# Patient Record
Sex: FEMALE | Race: ASIAN | NOT HISPANIC OR LATINO
[De-identification: names, ages, dates, MRNs, and addresses within clinical notes are randomized per-mention and may not be internally consistent; named-entity substitution may affect disease eponyms.]

---

## 2017-01-24 ENCOUNTER — APPOINTMENT (OUTPATIENT)
Dept: THORACIC SURGERY | Facility: CLINIC | Age: 77
End: 2017-01-24

## 2017-02-07 ENCOUNTER — APPOINTMENT (OUTPATIENT)
Dept: THORACIC SURGERY | Facility: CLINIC | Age: 77
End: 2017-02-07

## 2017-02-07 VITALS
TEMPERATURE: 98 F | HEART RATE: 71 BPM | BODY MASS INDEX: 23.91 KG/M2 | DIASTOLIC BLOOD PRESSURE: 74 MMHG | OXYGEN SATURATION: 97 % | WEIGHT: 135 LBS | RESPIRATION RATE: 18 BRPM | SYSTOLIC BLOOD PRESSURE: 135 MMHG

## 2017-02-07 RX ORDER — METOPROLOL SUCCINATE 25 MG/1
25 TABLET, EXTENDED RELEASE ORAL
Refills: 0 | Status: ACTIVE | COMMUNITY

## 2017-02-23 ENCOUNTER — APPOINTMENT (OUTPATIENT)
Dept: SURGERY | Facility: CLINIC | Age: 77
End: 2017-02-23

## 2017-02-23 VITALS
BODY MASS INDEX: 23.39 KG/M2 | OXYGEN SATURATION: 98 % | HEIGHT: 63 IN | SYSTOLIC BLOOD PRESSURE: 126 MMHG | DIASTOLIC BLOOD PRESSURE: 76 MMHG | HEART RATE: 66 BPM | TEMPERATURE: 97.8 F | WEIGHT: 132 LBS | RESPIRATION RATE: 16 BRPM

## 2017-02-23 RX ORDER — DEXLANSOPRAZOLE 60 MG/1
60 CAPSULE, DELAYED RELEASE ORAL
Qty: 30 | Refills: 0 | Status: DISCONTINUED | COMMUNITY
Start: 2017-02-07

## 2017-02-23 RX ORDER — ZOLPIDEM TARTRATE 10 MG/1
10 TABLET ORAL
Qty: 20 | Refills: 0 | Status: DISCONTINUED | COMMUNITY
Start: 2017-02-20

## 2017-02-23 RX ORDER — CIPROFLOXACIN HYDROCHLORIDE 500 MG/1
500 TABLET, FILM COATED ORAL
Qty: 14 | Refills: 0 | Status: DISCONTINUED | COMMUNITY
Start: 2017-02-21

## 2017-02-24 ENCOUNTER — TRANSCRIPTION ENCOUNTER (OUTPATIENT)
Age: 77
End: 2017-02-24

## 2017-03-02 ENCOUNTER — RESULT REVIEW (OUTPATIENT)
Age: 77
End: 2017-03-02

## 2017-03-02 ENCOUNTER — EMERGENCY (EMERGENCY)
Facility: HOSPITAL | Age: 77
LOS: 1 days | Discharge: HOME | End: 2017-03-02
Attending: EMERGENCY MEDICINE | Admitting: EMERGENCY MEDICINE
Payer: MEDICARE

## 2017-03-02 VITALS
RESPIRATION RATE: 16 BRPM | SYSTOLIC BLOOD PRESSURE: 135 MMHG | DIASTOLIC BLOOD PRESSURE: 65 MMHG | OXYGEN SATURATION: 99 % | HEART RATE: 80 BPM

## 2017-03-02 VITALS
RESPIRATION RATE: 18 BRPM | OXYGEN SATURATION: 98 % | TEMPERATURE: 98 F | HEART RATE: 85 BPM | SYSTOLIC BLOOD PRESSURE: 144 MMHG | DIASTOLIC BLOOD PRESSURE: 60 MMHG

## 2017-03-02 DIAGNOSIS — Z90.2 ACQUIRED ABSENCE OF LUNG [PART OF]: Chronic | ICD-10-CM

## 2017-03-02 DIAGNOSIS — Z98.89 OTHER SPECIFIED POSTPROCEDURAL STATES: Chronic | ICD-10-CM

## 2017-03-02 LAB
ALBUMIN SERPL ELPH-MCNC: 4 G/DL — SIGNIFICANT CHANGE UP (ref 3.3–5)
ALP SERPL-CCNC: 78 U/L — SIGNIFICANT CHANGE UP (ref 40–120)
ALT FLD-CCNC: 25 U/L — SIGNIFICANT CHANGE UP (ref 4–33)
APPEARANCE UR: CLEAR — SIGNIFICANT CHANGE UP
AST SERPL-CCNC: 32 U/L — SIGNIFICANT CHANGE UP (ref 4–32)
BASE EXCESS BLDV CALC-SCNC: -0.9 MMOL/L — SIGNIFICANT CHANGE UP
BASOPHILS # BLD AUTO: 0.01 K/UL — SIGNIFICANT CHANGE UP (ref 0–0.2)
BASOPHILS NFR BLD AUTO: 0.2 % — SIGNIFICANT CHANGE UP (ref 0–2)
BILIRUB SERPL-MCNC: 1.2 MG/DL — SIGNIFICANT CHANGE UP (ref 0.2–1.2)
BILIRUB UR-MCNC: NEGATIVE — SIGNIFICANT CHANGE UP
BLOOD GAS VENOUS - CREATININE: SIGNIFICANT CHANGE UP MG/DL (ref 0.5–1.3)
BLOOD UR QL VISUAL: NEGATIVE — SIGNIFICANT CHANGE UP
BUN SERPL-MCNC: 13 MG/DL — SIGNIFICANT CHANGE UP (ref 7–23)
CALCIUM SERPL-MCNC: 9.4 MG/DL — SIGNIFICANT CHANGE UP (ref 8.4–10.5)
CHLORIDE BLDV-SCNC: 115 MMOL/L — HIGH (ref 96–108)
CHLORIDE SERPL-SCNC: 103 MMOL/L — SIGNIFICANT CHANGE UP (ref 98–107)
CO2 SERPL-SCNC: 23 MMOL/L — SIGNIFICANT CHANGE UP (ref 22–31)
COLOR SPEC: SIGNIFICANT CHANGE UP
CREAT SERPL-MCNC: 0.62 MG/DL — SIGNIFICANT CHANGE UP (ref 0.5–1.3)
EOSINOPHIL # BLD AUTO: 0.02 K/UL — SIGNIFICANT CHANGE UP (ref 0–0.5)
EOSINOPHIL NFR BLD AUTO: 0.4 % — SIGNIFICANT CHANGE UP (ref 0–6)
GAS PNL BLDV: 141 MMOL/L — SIGNIFICANT CHANGE UP (ref 136–146)
GLUCOSE BLDV-MCNC: 90 — SIGNIFICANT CHANGE UP (ref 70–99)
GLUCOSE SERPL-MCNC: 116 MG/DL — HIGH (ref 70–99)
GLUCOSE UR-MCNC: NEGATIVE — SIGNIFICANT CHANGE UP
HCO3 BLDV-SCNC: 24 MMOL/L — SIGNIFICANT CHANGE UP (ref 20–27)
HCT VFR BLD CALC: 39.6 % — SIGNIFICANT CHANGE UP (ref 34.5–45)
HCT VFR BLDV CALC: 35 % — SIGNIFICANT CHANGE UP (ref 34.5–45)
HGB BLD-MCNC: 12.7 G/DL — SIGNIFICANT CHANGE UP (ref 11.5–15.5)
HGB BLDV-MCNC: 11.4 G/DL — LOW (ref 11.5–15.5)
IMM GRANULOCYTES NFR BLD AUTO: 0.2 % — SIGNIFICANT CHANGE UP (ref 0–1.5)
KETONES UR-MCNC: NEGATIVE — SIGNIFICANT CHANGE UP
LACTATE BLDV-MCNC: 1 MMOL/L — SIGNIFICANT CHANGE UP (ref 0.5–2)
LEUKOCYTE ESTERASE UR-ACNC: NEGATIVE — SIGNIFICANT CHANGE UP
LYMPHOCYTES # BLD AUTO: 1.61 K/UL — SIGNIFICANT CHANGE UP (ref 1–3.3)
LYMPHOCYTES # BLD AUTO: 30.4 % — SIGNIFICANT CHANGE UP (ref 13–44)
MCHC RBC-ENTMCNC: 32.1 % — SIGNIFICANT CHANGE UP (ref 32–36)
MCHC RBC-ENTMCNC: 32.2 PG — SIGNIFICANT CHANGE UP (ref 27–34)
MCV RBC AUTO: 100.5 FL — HIGH (ref 80–100)
MONOCYTES # BLD AUTO: 0.46 K/UL — SIGNIFICANT CHANGE UP (ref 0–0.9)
MONOCYTES NFR BLD AUTO: 8.7 % — SIGNIFICANT CHANGE UP (ref 2–14)
NEUTROPHILS # BLD AUTO: 3.19 K/UL — SIGNIFICANT CHANGE UP (ref 1.8–7.4)
NEUTROPHILS NFR BLD AUTO: 60.1 % — SIGNIFICANT CHANGE UP (ref 43–77)
NITRITE UR-MCNC: NEGATIVE — SIGNIFICANT CHANGE UP
PCO2 BLDV: 31 MMHG — LOW (ref 41–51)
PH BLDV: 7.47 PH — HIGH (ref 7.32–7.43)
PH UR: 6.5 — SIGNIFICANT CHANGE UP (ref 4.6–8)
PLATELET # BLD AUTO: 171 K/UL — SIGNIFICANT CHANGE UP (ref 150–400)
PMV BLD: 10.9 FL — SIGNIFICANT CHANGE UP (ref 7–13)
PO2 BLDV: 66 MMHG — HIGH (ref 35–40)
POTASSIUM BLDV-SCNC: 4 MMOL/L — SIGNIFICANT CHANGE UP (ref 3.4–4.5)
POTASSIUM SERPL-MCNC: 4.4 MMOL/L — SIGNIFICANT CHANGE UP (ref 3.5–5.3)
POTASSIUM SERPL-SCNC: 4.4 MMOL/L — SIGNIFICANT CHANGE UP (ref 3.5–5.3)
PROT SERPL-MCNC: 7.7 G/DL — SIGNIFICANT CHANGE UP (ref 6–8.3)
PROT UR-MCNC: NEGATIVE — SIGNIFICANT CHANGE UP
RBC # BLD: 3.94 M/UL — SIGNIFICANT CHANGE UP (ref 3.8–5.2)
RBC # FLD: 12.3 % — SIGNIFICANT CHANGE UP (ref 10.3–14.5)
RBC CASTS # UR COMP ASSIST: SIGNIFICANT CHANGE UP (ref 0–?)
SAO2 % BLDV: 94.5 % — HIGH (ref 60–85)
SODIUM SERPL-SCNC: 141 MMOL/L — SIGNIFICANT CHANGE UP (ref 135–145)
SP GR SPEC: 1 — SIGNIFICANT CHANGE UP (ref 1–1.03)
SQUAMOUS # UR AUTO: SIGNIFICANT CHANGE UP
UROBILINOGEN FLD QL: NORMAL E.U. — SIGNIFICANT CHANGE UP (ref 0.1–0.2)
WBC # BLD: 5.3 K/UL — SIGNIFICANT CHANGE UP (ref 3.8–10.5)
WBC # FLD AUTO: 5.3 K/UL — SIGNIFICANT CHANGE UP (ref 3.8–10.5)
WBC UR QL: SIGNIFICANT CHANGE UP (ref 0–?)

## 2017-03-02 PROCEDURE — 74177 CT ABD & PELVIS W/CONTRAST: CPT | Mod: 26

## 2017-03-02 PROCEDURE — 99285 EMERGENCY DEPT VISIT HI MDM: CPT | Mod: GC

## 2017-03-02 RX ORDER — OXYBUTYNIN CHLORIDE 5 MG
1 TABLET ORAL
Qty: 30 | Refills: 0 | OUTPATIENT
Start: 2017-03-02 | End: 2017-03-12

## 2017-03-02 NOTE — ED ADULT NURSE NOTE - OBJECTIVE STATEMENT
75 y/o female presents to ED with c/o lower abd pain.  With daughter as , pt states that she has been having lower abd pain for the past month and then had hematuria 1 week ago and was seen by urologist and treated with course of antibiotics that was completed today.  Pt also has history of non small cell lung cancer, completed chemo in Dec 2016, pt has PET scan a few weeks ago and a peritoneal mass was found, pt scheduled for biopsy tomorrow.  Pt states that last night she began having more severe lower abd pain associated with urinary frequency and urgency, denies fever chills, no n/v/d, denies CP, SOB.

## 2017-03-02 NOTE — ED PROVIDER NOTE - ATTENDING CONTRIBUTION TO CARE
Nissa 76F p/w pelvic pain and urinary frequency.  Recently tx outpt for similar sx for UTI - (PA called PMD - uCx negative).  Scheduled tomorrow for cystoscopy.  Worsening pain, unable to sleep last night.  Does not want pain medication here.  Reports dysuria persists after tx for UTI however hematuria improved.  VS:  unremarkable    GEN - mild distress pelvic pain; A+O x3   HEAD - NC/AT     ENT - PEERL, EOMI, mucous membranes dry , no discharge      NECK: Neck supple, non-tender without lymphadenopathy, no masses, no JVD  PULM - CTA b/l,  symmetric breath sounds  COR -  normal heart sounds    ABD - , ND, suprapubic ttp, soft, no guarding, no rebound, no masses    BACK - no CVA tenderness, nontender spine     EXTREMS - no edema, no deformity, warm and well perfused    SKIN - no rash or bruising      NEUROLOGIC - alert, CN 2-12 intact, sensation nl, motor 5/5 RUE/LUE/RLE/LLE.      IMP:  76F p/w pelvic pain and dysuria worsening, keeping her up at night.  Recent dx via PET/CT of peritoneal carcinomatosis, following with H/o at [facility] and transferring all care to [facility].  NB pt refused pelvic exam even by a female.  Given neg UA would check CT for anatomic problem santiago given carcinomatosis.  All found unremarkable/unchanged, rx ditropan, f/u Urology.

## 2017-03-02 NOTE — ED PROVIDER NOTE - MEDICAL DECISION MAKING DETAILS
77 yo F PMH Breast Ca, Lung Ca, and newly dx pelvic mass c/o pelvic pain and urinary frequency  R/O UTI  CBC/CMP, U/A, Pain Control, CT Pelvis

## 2017-03-02 NOTE — ED ADULT TRIAGE NOTE - CHIEF COMPLAINT QUOTE
p/t c/o of lower abd pain and dysuria for one week p/t recently completed chemo treatment for lung ca denies any nausea denies vomiting

## 2017-03-02 NOTE — ED PROVIDER NOTE - PMH
Breast cancer  2007 Left Mastectomy  Headache    Hepatitis B    Hyperlipidemia    Lung mass    Pelvic mass in female  diagnosed via PET/CT in 2/2017

## 2017-03-02 NOTE — ED PROVIDER NOTE - OBJECTIVE STATEMENT
75 yo F PMH Breast Ca (2007), Lung Ca (2016), HLD, and pelvic mass presents with pelvic pain and urinary frequency. Pt was recently treated outpatient for UTI by Dr. Finch with Cipro (regimen completed today), cultures were negative; pt hematuria has resolved but urinary frequency and dysuria still present. Pt has pelvic mass (questionable mets from lung) diagnosed 2 weeks ago via PET/CT scan by oncologist. Pt has bx scheduled for tomorrow with Dr. Malhotra ([facility]). Pt also has follow up with Dr. Finch for cystoscopy. Pt reports having diffuse suprapubic pain, ongoing for 2 months, increased in severity from baseline last night; refusing pain medication in ED and has taken no pain meds at home. Pt denies any chest pain, SOB, syncope, dizziness, headache, N/V/D, abdominal pain, hematuria, flank pain.

## 2017-03-03 ENCOUNTER — APPOINTMENT (OUTPATIENT)
Dept: CT IMAGING | Facility: HOSPITAL | Age: 77
End: 2017-03-03

## 2017-03-03 ENCOUNTER — OUTPATIENT (OUTPATIENT)
Dept: OUTPATIENT SERVICES | Facility: HOSPITAL | Age: 77
LOS: 1 days | End: 2017-03-03
Payer: MEDICARE

## 2017-03-03 DIAGNOSIS — Z98.89 OTHER SPECIFIED POSTPROCEDURAL STATES: Chronic | ICD-10-CM

## 2017-03-03 DIAGNOSIS — C78.6 SECONDARY MALIGNANT NEOPLASM OF RETROPERITONEUM AND PERITONEUM: ICD-10-CM

## 2017-03-03 DIAGNOSIS — Z90.2 ACQUIRED ABSENCE OF LUNG [PART OF]: Chronic | ICD-10-CM

## 2017-03-03 LAB — SPECIMEN SOURCE: SIGNIFICANT CHANGE UP

## 2017-03-03 PROCEDURE — 88305 TISSUE EXAM BY PATHOLOGIST: CPT

## 2017-03-03 PROCEDURE — 88341 IMHCHEM/IMCYTCHM EA ADD ANTB: CPT

## 2017-03-03 PROCEDURE — 77012 CT SCAN FOR NEEDLE BIOPSY: CPT | Mod: 26

## 2017-03-03 PROCEDURE — 88172 CYTP DX EVAL FNA 1ST EA SITE: CPT

## 2017-03-03 PROCEDURE — 49180 BIOPSY ABDOMINAL MASS: CPT

## 2017-03-03 PROCEDURE — 88341 IMHCHEM/IMCYTCHM EA ADD ANTB: CPT | Mod: 26,59

## 2017-03-03 PROCEDURE — 88173 CYTOPATH EVAL FNA REPORT: CPT | Mod: 26

## 2017-03-03 PROCEDURE — 88173 CYTOPATH EVAL FNA REPORT: CPT

## 2017-03-03 PROCEDURE — 88360 TUMOR IMMUNOHISTOCHEM/MANUAL: CPT | Mod: 26

## 2017-03-03 PROCEDURE — 88305 TISSUE EXAM BY PATHOLOGIST: CPT | Mod: 26

## 2017-03-03 PROCEDURE — 88342 IMHCHEM/IMCYTCHM 1ST ANTB: CPT | Mod: 26,59

## 2017-03-03 PROCEDURE — 88342 IMHCHEM/IMCYTCHM 1ST ANTB: CPT

## 2017-03-03 PROCEDURE — 77012 CT SCAN FOR NEEDLE BIOPSY: CPT

## 2017-03-03 PROCEDURE — 88360 TUMOR IMMUNOHISTOCHEM/MANUAL: CPT

## 2017-03-04 ENCOUNTER — OUTPATIENT (OUTPATIENT)
Dept: OUTPATIENT SERVICES | Facility: HOSPITAL | Age: 77
LOS: 1 days | Discharge: HOME | End: 2017-03-04

## 2017-03-04 DIAGNOSIS — Z98.89 OTHER SPECIFIED POSTPROCEDURAL STATES: Chronic | ICD-10-CM

## 2017-03-04 DIAGNOSIS — C78.6 SECONDARY MALIGNANT NEOPLASM OF RETROPERITONEUM AND PERITONEUM: ICD-10-CM

## 2017-03-04 DIAGNOSIS — Z90.2 ACQUIRED ABSENCE OF LUNG [PART OF]: Chronic | ICD-10-CM

## 2017-03-04 LAB — BACTERIA UR CULT: SIGNIFICANT CHANGE UP

## 2017-03-06 ENCOUNTER — RESULT REVIEW (OUTPATIENT)
Age: 77
End: 2017-03-06

## 2017-03-06 LAB — NON-GYNECOLOGICAL CYTOLOGY STUDY: SIGNIFICANT CHANGE UP

## 2017-03-07 ENCOUNTER — APPOINTMENT (OUTPATIENT)
Dept: HEMATOLOGY ONCOLOGY | Facility: CLINIC | Age: 77
End: 2017-03-07

## 2017-03-07 VITALS
RESPIRATION RATE: 16 BRPM | WEIGHT: 130.07 LBS | SYSTOLIC BLOOD PRESSURE: 140 MMHG | TEMPERATURE: 97.3 F | DIASTOLIC BLOOD PRESSURE: 70 MMHG | HEIGHT: 63.98 IN | BODY MASS INDEX: 22.21 KG/M2 | HEART RATE: 52 BPM | OXYGEN SATURATION: 99 %

## 2017-03-07 DIAGNOSIS — D15.0 BENIGN NEOPLASM OF THYMUS: ICD-10-CM

## 2017-03-07 LAB
BASOPHILS # BLD AUTO: 0 K/UL — SIGNIFICANT CHANGE UP (ref 0–0.2)
BASOPHILS NFR BLD AUTO: 0.4 % — SIGNIFICANT CHANGE UP (ref 0–2)
EOSINOPHIL # BLD AUTO: 0.1 K/UL — SIGNIFICANT CHANGE UP (ref 0–0.5)
EOSINOPHIL NFR BLD AUTO: 1.1 % — SIGNIFICANT CHANGE UP (ref 0–6)
HCT VFR BLD CALC: 37.7 % — SIGNIFICANT CHANGE UP (ref 34.5–45)
HGB BLD-MCNC: 12.8 G/DL — SIGNIFICANT CHANGE UP (ref 11.5–15.5)
LYMPHOCYTES # BLD AUTO: 2.1 K/UL — SIGNIFICANT CHANGE UP (ref 1–3.3)
LYMPHOCYTES # BLD AUTO: 37.3 % — SIGNIFICANT CHANGE UP (ref 13–44)
MCHC RBC-ENTMCNC: 33.5 PG — SIGNIFICANT CHANGE UP (ref 27–34)
MCHC RBC-ENTMCNC: 33.8 G/DL — SIGNIFICANT CHANGE UP (ref 32–36)
MCV RBC AUTO: 99.2 FL — SIGNIFICANT CHANGE UP (ref 80–100)
MONOCYTES # BLD AUTO: 0.5 K/UL — SIGNIFICANT CHANGE UP (ref 0–0.9)
MONOCYTES NFR BLD AUTO: 9.4 % — SIGNIFICANT CHANGE UP (ref 2–14)
NEUTROPHILS # BLD AUTO: 2.9 K/UL — SIGNIFICANT CHANGE UP (ref 1.8–7.4)
NEUTROPHILS NFR BLD AUTO: 51.8 % — SIGNIFICANT CHANGE UP (ref 43–77)
PLATELET # BLD AUTO: 148 K/UL — LOW (ref 150–400)
RBC # BLD: 3.8 M/UL — SIGNIFICANT CHANGE UP (ref 3.8–5.2)
RBC # FLD: 11.4 % — SIGNIFICANT CHANGE UP (ref 10.3–14.5)
WBC # BLD: 5.6 K/UL — SIGNIFICANT CHANGE UP (ref 3.8–10.5)
WBC # FLD AUTO: 5.6 K/UL — SIGNIFICANT CHANGE UP (ref 3.8–10.5)

## 2017-03-07 RX ORDER — FILGRASTIM-SNDZ 480 UG/.8ML
480 INJECTION, SOLUTION INTRAVENOUS; SUBCUTANEOUS
Refills: 0 | Status: DISCONTINUED | COMMUNITY
End: 2017-03-07

## 2017-03-07 RX ORDER — CARBOPLATIN 10 MG/ML
150 INJECTION, SOLUTION INTRAVENOUS
Refills: 0 | Status: DISCONTINUED | COMMUNITY
End: 2017-03-07

## 2017-03-07 RX ORDER — PALONOSETRON HYDROCHLORIDE 0.05 MG/ML
0.25 INJECTION INTRAVENOUS
Refills: 0 | Status: DISCONTINUED | COMMUNITY
End: 2017-03-07

## 2017-03-07 RX ORDER — PEMETREXED DISODIUM 500 MG/20ML
500 INJECTION, POWDER, LYOPHILIZED, FOR SOLUTION INTRAVENOUS
Refills: 0 | Status: DISCONTINUED | COMMUNITY
End: 2017-03-07

## 2017-03-07 RX ORDER — DEXAMETHASONE SODIUM PHOSPHATE 10 MG/ML
10 INJECTION INTRAMUSCULAR; INTRAVENOUS
Refills: 0 | Status: DISCONTINUED | COMMUNITY
End: 2017-03-07

## 2017-03-07 RX ORDER — OXYCODONE 5 MG/1
5 TABLET ORAL
Qty: 120 | Refills: 0 | Status: ACTIVE | COMMUNITY
Start: 2017-03-07 | End: 1900-01-01

## 2017-03-08 DIAGNOSIS — R19.03 RIGHT LOWER QUADRANT ABDOMINAL SWELLING, MASS AND LUMP: ICD-10-CM

## 2017-03-13 ENCOUNTER — RECORD ABSTRACTING (OUTPATIENT)
Age: 77
End: 2017-03-13

## 2017-03-13 DIAGNOSIS — Z85.3 PERSONAL HISTORY OF MALIGNANT NEOPLASM OF BREAST: ICD-10-CM

## 2017-03-13 DIAGNOSIS — Z87.898 PERSONAL HISTORY OF OTHER SPECIFIED CONDITIONS: ICD-10-CM

## 2017-03-13 DIAGNOSIS — J98.59 OTHER DISEASES OF MEDIASTINUM, NOT ELSEWHERE CLASSIFIED: ICD-10-CM

## 2017-03-13 DIAGNOSIS — C34.32 MALIGNANT NEOPLASM OF LOWER LOBE, LEFT BRONCHUS OR LUNG: ICD-10-CM

## 2017-03-13 DIAGNOSIS — Z87.39 PERSONAL HISTORY OF OTHER DISEASES OF THE MUSCULOSKELETAL SYSTEM AND CONNECTIVE TISSUE: ICD-10-CM

## 2017-03-13 DIAGNOSIS — Z86.39 PERSONAL HISTORY OF OTHER ENDOCRINE, NUTRITIONAL AND METABOLIC DISEASE: ICD-10-CM

## 2017-03-15 ENCOUNTER — APPOINTMENT (OUTPATIENT)
Dept: GYNECOLOGIC ONCOLOGY | Facility: CLINIC | Age: 77
End: 2017-03-15

## 2017-03-15 VITALS
BODY MASS INDEX: 22.53 KG/M2 | DIASTOLIC BLOOD PRESSURE: 64 MMHG | HEIGHT: 64 IN | HEART RATE: 58 BPM | WEIGHT: 132 LBS | SYSTOLIC BLOOD PRESSURE: 124 MMHG

## 2017-03-15 DIAGNOSIS — C80.1 SECONDARY MALIGNANT NEOPLASM OF RETROPERITONEUM AND PERITONEUM: ICD-10-CM

## 2017-03-15 DIAGNOSIS — C78.6 SECONDARY MALIGNANT NEOPLASM OF RETROPERITONEUM AND PERITONEUM: ICD-10-CM

## 2017-03-15 DIAGNOSIS — R97.1 ELEVATED CANCER ANTIGEN 125 [CA 125]: ICD-10-CM

## 2017-03-15 DIAGNOSIS — C34.92 MALIGNANT NEOPLASM OF UNSPECIFIED PART OF LEFT BRONCHUS OR LUNG: ICD-10-CM

## 2017-03-15 RX ORDER — LEVOFLOXACIN 500 MG/1
500 TABLET, FILM COATED ORAL
Refills: 0 | Status: COMPLETED | COMMUNITY
End: 2017-03-15

## 2017-03-15 RX ORDER — POTASSIUM CHLORIDE 1.5 G/1.58G
20 POWDER, FOR SOLUTION ORAL
Refills: 0 | Status: COMPLETED | COMMUNITY
End: 2017-03-15

## 2017-03-15 RX ORDER — THIAMINE HCL 100 MG
500 TABLET ORAL
Refills: 0 | Status: COMPLETED | COMMUNITY
End: 2017-03-15

## 2017-03-16 ENCOUNTER — APPOINTMENT (OUTPATIENT)
Dept: SURGERY | Facility: CLINIC | Age: 77
End: 2017-03-16

## 2017-03-16 LAB
ALBUMIN SERPL ELPH-MCNC: 4.2 G/DL
ALP BLD-CCNC: 80 U/L
ALT SERPL-CCNC: 21 U/L
ANION GAP SERPL CALC-SCNC: 16 MMOL/L
AST SERPL-CCNC: 24 U/L
BILIRUB SERPL-MCNC: 0.4 MG/DL
BUN SERPL-MCNC: 11 MG/DL
CALCIUM SERPL-MCNC: 9.5 MG/DL
CANCER AG125 SERPL-ACNC: 392 U/ML
CANCER AG19-9 SERPL-ACNC: 22.4 U/ML
CEA SERPL-MCNC: 2 NG/ML
CHLORIDE SERPL-SCNC: 101 MMOL/L
CO2 SERPL-SCNC: 25 MMOL/L
CREAT SERPL-MCNC: 0.6 MG/DL
GLUCOSE SERPL-MCNC: 102 MG/DL
LDH SERPL-CCNC: 205 U/L
POTASSIUM SERPL-SCNC: 4.3 MMOL/L
PROT SERPL-MCNC: 7.5 G/DL
SODIUM SERPL-SCNC: 142 MMOL/L

## 2017-03-17 ENCOUNTER — MOBILE ON CALL (OUTPATIENT)
Age: 77
End: 2017-03-17

## 2017-03-23 ENCOUNTER — OTHER (OUTPATIENT)
Age: 77
End: 2017-03-23

## 2017-03-31 ENCOUNTER — APPOINTMENT (OUTPATIENT)
Dept: GYNECOLOGIC ONCOLOGY | Facility: HOSPITAL | Age: 77
End: 2017-03-31

## 2017-04-25 ENCOUNTER — APPOINTMENT (OUTPATIENT)
Dept: THORACIC SURGERY | Facility: CLINIC | Age: 77
End: 2017-04-25

## 2017-08-08 ENCOUNTER — APPOINTMENT (OUTPATIENT)
Dept: THORACIC SURGERY | Facility: CLINIC | Age: 77
End: 2017-08-08

## 2018-01-22 ENCOUNTER — INPATIENT (INPATIENT)
Facility: HOSPITAL | Age: 78
LOS: 2 days | Discharge: HOME | End: 2018-01-25
Attending: HOSPITALIST | Admitting: HOSPITALIST
Payer: MEDICARE

## 2018-01-22 VITALS
SYSTOLIC BLOOD PRESSURE: 122 MMHG | HEART RATE: 72 BPM | TEMPERATURE: 98 F | OXYGEN SATURATION: 100 % | RESPIRATION RATE: 18 BRPM | DIASTOLIC BLOOD PRESSURE: 65 MMHG

## 2018-01-22 DIAGNOSIS — Z98.89 OTHER SPECIFIED POSTPROCEDURAL STATES: Chronic | ICD-10-CM

## 2018-01-22 DIAGNOSIS — Z90.2 ACQUIRED ABSENCE OF LUNG [PART OF]: Chronic | ICD-10-CM

## 2018-01-22 LAB
ALBUMIN SERPL ELPH-MCNC: 3.4 G/DL — SIGNIFICANT CHANGE UP (ref 3.3–5)
ALP SERPL-CCNC: 67 U/L — SIGNIFICANT CHANGE UP (ref 40–120)
ALT FLD-CCNC: 10 U/L — SIGNIFICANT CHANGE UP (ref 4–33)
APTT BLD: 25.7 SEC — LOW (ref 27.5–37.4)
AST SERPL-CCNC: 15 U/L — SIGNIFICANT CHANGE UP (ref 4–32)
B PERT DNA SPEC QL NAA+PROBE: SIGNIFICANT CHANGE UP
BASE EXCESS BLDV CALC-SCNC: 5.9 MMOL/L — SIGNIFICANT CHANGE UP
BASOPHILS # BLD AUTO: 0 K/UL — SIGNIFICANT CHANGE UP (ref 0–0.2)
BASOPHILS NFR BLD AUTO: 0 % — SIGNIFICANT CHANGE UP (ref 0–2)
BASOPHILS NFR SPEC: 2.3 % — HIGH (ref 0–2)
BILIRUB SERPL-MCNC: 0.5 MG/DL — SIGNIFICANT CHANGE UP (ref 0.2–1.2)
BLOOD GAS VENOUS - CREATININE: 0.56 MG/DL — SIGNIFICANT CHANGE UP (ref 0.5–1.3)
BUN SERPL-MCNC: 9 MG/DL — SIGNIFICANT CHANGE UP (ref 7–23)
C PNEUM DNA SPEC QL NAA+PROBE: NOT DETECTED — SIGNIFICANT CHANGE UP
CALCIUM SERPL-MCNC: 9 MG/DL — SIGNIFICANT CHANGE UP (ref 8.4–10.5)
CHLORIDE BLDV-SCNC: 103 MMOL/L — SIGNIFICANT CHANGE UP (ref 96–108)
CHLORIDE SERPL-SCNC: 96 MMOL/L — LOW (ref 98–107)
CK SERPL-CCNC: 70 U/L — SIGNIFICANT CHANGE UP (ref 25–170)
CO2 SERPL-SCNC: 27 MMOL/L — SIGNIFICANT CHANGE UP (ref 22–31)
CREAT SERPL-MCNC: 0.61 MG/DL — SIGNIFICANT CHANGE UP (ref 0.5–1.3)
EOSINOPHIL # BLD AUTO: 0.03 K/UL — SIGNIFICANT CHANGE UP (ref 0–0.5)
EOSINOPHIL NFR BLD AUTO: 1.2 % — SIGNIFICANT CHANGE UP (ref 0–6)
EOSINOPHIL NFR FLD: 2.3 % — SIGNIFICANT CHANGE UP (ref 0–6)
FLUAV H1 2009 PAND RNA SPEC QL NAA+PROBE: NOT DETECTED — SIGNIFICANT CHANGE UP
FLUAV H1 RNA SPEC QL NAA+PROBE: NOT DETECTED — SIGNIFICANT CHANGE UP
FLUAV H3 RNA SPEC QL NAA+PROBE: NOT DETECTED — SIGNIFICANT CHANGE UP
FLUAV SUBTYP SPEC NAA+PROBE: SIGNIFICANT CHANGE UP
FLUBV RNA SPEC QL NAA+PROBE: NOT DETECTED — SIGNIFICANT CHANGE UP
GAS PNL BLDV: 131 MMOL/L — LOW (ref 136–146)
GIANT PLATELETS BLD QL SMEAR: PRESENT — SIGNIFICANT CHANGE UP
GLUCOSE BLDV-MCNC: 105 — HIGH (ref 70–99)
GLUCOSE SERPL-MCNC: 99 MG/DL — SIGNIFICANT CHANGE UP (ref 70–99)
HADV DNA SPEC QL NAA+PROBE: NOT DETECTED — SIGNIFICANT CHANGE UP
HCO3 BLDV-SCNC: 29 MMOL/L — HIGH (ref 20–27)
HCOV 229E RNA SPEC QL NAA+PROBE: NOT DETECTED — SIGNIFICANT CHANGE UP
HCOV HKU1 RNA SPEC QL NAA+PROBE: NOT DETECTED — SIGNIFICANT CHANGE UP
HCOV NL63 RNA SPEC QL NAA+PROBE: NOT DETECTED — SIGNIFICANT CHANGE UP
HCOV OC43 RNA SPEC QL NAA+PROBE: NOT DETECTED — SIGNIFICANT CHANGE UP
HCT VFR BLD CALC: 32.9 % — LOW (ref 34.5–45)
HCT VFR BLDV CALC: 33 % — LOW (ref 34.5–45)
HGB BLD-MCNC: 10.6 G/DL — LOW (ref 11.5–15.5)
HGB BLDV-MCNC: 10.7 G/DL — LOW (ref 11.5–15.5)
HMPV RNA SPEC QL NAA+PROBE: NOT DETECTED — SIGNIFICANT CHANGE UP
HPIV1 RNA SPEC QL NAA+PROBE: NOT DETECTED — SIGNIFICANT CHANGE UP
HPIV2 RNA SPEC QL NAA+PROBE: NOT DETECTED — SIGNIFICANT CHANGE UP
HPIV3 RNA SPEC QL NAA+PROBE: NOT DETECTED — SIGNIFICANT CHANGE UP
HPIV4 RNA SPEC QL NAA+PROBE: NOT DETECTED — SIGNIFICANT CHANGE UP
IMM GRANULOCYTES # BLD AUTO: 0.01 # — SIGNIFICANT CHANGE UP
IMM GRANULOCYTES NFR BLD AUTO: 0.4 % — SIGNIFICANT CHANGE UP (ref 0–1.5)
INR BLD: 1.07 — SIGNIFICANT CHANGE UP (ref 0.88–1.17)
LACTATE BLDV-MCNC: 0.9 MMOL/L — SIGNIFICANT CHANGE UP (ref 0.5–2)
LYMPHOCYTES # BLD AUTO: 0.91 K/UL — LOW (ref 1–3.3)
LYMPHOCYTES # BLD AUTO: 37.3 % — SIGNIFICANT CHANGE UP (ref 13–44)
LYMPHOCYTES NFR SPEC AUTO: 27.9 % — SIGNIFICANT CHANGE UP (ref 13–44)
M PNEUMO DNA SPEC QL NAA+PROBE: NOT DETECTED — SIGNIFICANT CHANGE UP
MANUAL SMEAR VERIFICATION: SIGNIFICANT CHANGE UP
MCHC RBC-ENTMCNC: 30.8 PG — SIGNIFICANT CHANGE UP (ref 27–34)
MCHC RBC-ENTMCNC: 32.2 % — SIGNIFICANT CHANGE UP (ref 32–36)
MCV RBC AUTO: 95.6 FL — SIGNIFICANT CHANGE UP (ref 80–100)
MONOCYTES # BLD AUTO: 0.37 K/UL — SIGNIFICANT CHANGE UP (ref 0–0.9)
MONOCYTES NFR BLD AUTO: 15.2 % — HIGH (ref 2–14)
MONOCYTES NFR BLD: 16.3 % — HIGH (ref 2–9)
MORPHOLOGY BLD-IMP: NORMAL — SIGNIFICANT CHANGE UP
NEUTROPHIL AB SER-ACNC: 44.2 % — SIGNIFICANT CHANGE UP (ref 43–77)
NEUTROPHILS # BLD AUTO: 1.12 K/UL — LOW (ref 1.8–7.4)
NEUTROPHILS NFR BLD AUTO: 45.9 % — SIGNIFICANT CHANGE UP (ref 43–77)
NRBC # FLD: 0 — SIGNIFICANT CHANGE UP
PCO2 BLDV: 49 MMHG — SIGNIFICANT CHANGE UP (ref 41–51)
PH BLDV: 7.41 PH — SIGNIFICANT CHANGE UP (ref 7.32–7.43)
PLATELET # BLD AUTO: 181 K/UL — SIGNIFICANT CHANGE UP (ref 150–400)
PLATELET COUNT - ESTIMATE: NORMAL — SIGNIFICANT CHANGE UP
PMV BLD: 10.1 FL — SIGNIFICANT CHANGE UP (ref 7–13)
PO2 BLDV: 31 MMHG — LOW (ref 35–40)
POTASSIUM BLDV-SCNC: 3.7 MMOL/L — SIGNIFICANT CHANGE UP (ref 3.4–4.5)
POTASSIUM SERPL-MCNC: 4.1 MMOL/L — SIGNIFICANT CHANGE UP (ref 3.5–5.3)
POTASSIUM SERPL-SCNC: 4.1 MMOL/L — SIGNIFICANT CHANGE UP (ref 3.5–5.3)
PROT SERPL-MCNC: 6.8 G/DL — SIGNIFICANT CHANGE UP (ref 6–8.3)
PROTHROM AB SERPL-ACNC: 11.9 SEC — SIGNIFICANT CHANGE UP (ref 9.8–13.1)
RBC # BLD: 3.44 M/UL — LOW (ref 3.8–5.2)
RBC # FLD: 14.6 % — HIGH (ref 10.3–14.5)
RSV RNA SPEC QL NAA+PROBE: NOT DETECTED — SIGNIFICANT CHANGE UP
RV+EV RNA SPEC QL NAA+PROBE: NOT DETECTED — SIGNIFICANT CHANGE UP
SAO2 % BLDV: 53.4 % — LOW (ref 60–85)
SODIUM SERPL-SCNC: 133 MMOL/L — LOW (ref 135–145)
TROPONIN T SERPL-MCNC: < 0.06 NG/ML — SIGNIFICANT CHANGE UP (ref 0–0.06)
VARIANT LYMPHS # BLD: 7 % — SIGNIFICANT CHANGE UP
WBC # BLD: 2.44 K/UL — LOW (ref 3.8–10.5)
WBC # FLD AUTO: 2.44 K/UL — LOW (ref 3.8–10.5)

## 2018-01-22 PROCEDURE — 71275 CT ANGIOGRAPHY CHEST: CPT | Mod: 26

## 2018-01-22 RX ORDER — PIPERACILLIN AND TAZOBACTAM 4; .5 G/20ML; G/20ML
3.38 INJECTION, POWDER, LYOPHILIZED, FOR SOLUTION INTRAVENOUS ONCE
Qty: 0 | Refills: 0 | Status: COMPLETED | OUTPATIENT
Start: 2018-01-22 | End: 2018-01-22

## 2018-01-22 RX ORDER — VANCOMYCIN HCL 1 G
1000 VIAL (EA) INTRAVENOUS ONCE
Qty: 0 | Refills: 0 | Status: COMPLETED | OUTPATIENT
Start: 2018-01-22 | End: 2018-01-22

## 2018-01-22 RX ORDER — ACETAMINOPHEN 500 MG
1000 TABLET ORAL ONCE
Qty: 0 | Refills: 0 | Status: COMPLETED | OUTPATIENT
Start: 2018-01-22 | End: 2018-01-22

## 2018-01-22 RX ADMIN — PIPERACILLIN AND TAZOBACTAM 200 GRAM(S): 4; .5 INJECTION, POWDER, LYOPHILIZED, FOR SOLUTION INTRAVENOUS at 19:03

## 2018-01-22 RX ADMIN — Medication 400 MILLIGRAM(S): at 19:03

## 2018-01-22 RX ADMIN — Medication 250 MILLIGRAM(S): at 19:04

## 2018-01-22 NOTE — ED PROVIDER NOTE - MEDICAL DECISION MAKING DETAILS
77F, extensive cancer hx on chemo presenting with fevers, cough, weakness, sore throat. Exam as above. Concern for infectious process given symptoms and immunocompromised state. Will begin antibx, tylenol, hycodan. Labs, Bcx, Ucx, CT chest. Currently stable, no acute distress. Will continue to follow up and re-assess. Case discussed with Attending: Mirtha Waldrop MD, PGY1

## 2018-01-22 NOTE — ED ADULT NURSE NOTE - OBJECTIVE STATEMENT
pt comes in c/o of 1 month of chest pain and cough--through  pt c/o  cough and chest pain x1 month and chest discomfort and some SOB  rectal temp-100.4--#20 angio placed rt wrist--labs done  awaiting md

## 2018-01-22 NOTE — ED PROVIDER NOTE - OBJECTIVE STATEMENT
77F, pmhx lung, ovarian, breast ca on chemo presents with chief complaint of 1 month of cough, chest pain, sore throat, fever. Patient speaks Mandarin only,  #[id number] used. Per patient, has had a month of cough with extensive sputum, as well as intermittent chest pain worse with cough. In addition, has been having recent sore throat and low fevers. Patient denies any abdominal pain or vomiting but endorses some nausea. Patient denies dysuria, headache, dizziness, blurry vision. Patient reports having had multiple surgeries for her cancers including abd surgery at some unknown time for ovrarian cancer as well as some surgical intervention last year for her breat and lung cancer on the left side. Patient had a left sided mastectomy as well as other procedures done. Currently on chemo, has right sided port. PCP Paul Rahman, [phone]

## 2018-01-22 NOTE — ED PROVIDER NOTE - PHYSICAL EXAMINATION
Breast exam performed with chaperone RN Darshana Kaur  Left mastectomy. No masses or lumps felt in right breast., no axillary lymph nodes felt bilaterally.   Cough   Minimal exp wheezes in upper lungs fields  Good air entry in all lung fields  Mid line abd scar noted   No abd tenderness  Febrile 100.4 rectally

## 2018-01-22 NOTE — ED ADULT TRIAGE NOTE - CHIEF COMPLAINT QUOTE
Patient brought to ER from PMD office by EMS for c/o bilateral breast pain. Pt had a lobectomy on left one and now has pain to both which is causing shortness of breath and something feels heavy on her chest. Pt has lung and ovarian cancer and sent for further evaluation.

## 2018-01-23 DIAGNOSIS — C56.9 MALIGNANT NEOPLASM OF UNSPECIFIED OVARY: ICD-10-CM

## 2018-01-23 DIAGNOSIS — C50.919 MALIGNANT NEOPLASM OF UNSPECIFIED SITE OF UNSPECIFIED FEMALE BREAST: ICD-10-CM

## 2018-01-23 DIAGNOSIS — R50.9 FEVER, UNSPECIFIED: ICD-10-CM

## 2018-01-23 DIAGNOSIS — Z29.9 ENCOUNTER FOR PROPHYLACTIC MEASURES, UNSPECIFIED: ICD-10-CM

## 2018-01-23 DIAGNOSIS — J45.901 UNSPECIFIED ASTHMA WITH (ACUTE) EXACERBATION: ICD-10-CM

## 2018-01-23 DIAGNOSIS — Z79.899 OTHER LONG TERM (CURRENT) DRUG THERAPY: ICD-10-CM

## 2018-01-23 DIAGNOSIS — R65.10 SYSTEMIC INFLAMMATORY RESPONSE SYNDROME (SIRS) OF NON-INFECTIOUS ORIGIN WITHOUT ACUTE ORGAN DYSFUNCTION: ICD-10-CM

## 2018-01-23 DIAGNOSIS — C34.90 MALIGNANT NEOPLASM OF UNSPECIFIED PART OF UNSPECIFIED BRONCHUS OR LUNG: ICD-10-CM

## 2018-01-23 DIAGNOSIS — B19.10 UNSPECIFIED VIRAL HEPATITIS B WITHOUT HEPATIC COMA: ICD-10-CM

## 2018-01-23 LAB
APPEARANCE UR: CLEAR — SIGNIFICANT CHANGE UP
BASOPHILS # BLD AUTO: 0 K/UL — SIGNIFICANT CHANGE UP (ref 0–0.2)
BASOPHILS NFR BLD AUTO: 0 % — SIGNIFICANT CHANGE UP (ref 0–2)
BILIRUB UR-MCNC: NEGATIVE — SIGNIFICANT CHANGE UP
BLOOD UR QL VISUAL: NEGATIVE — SIGNIFICANT CHANGE UP
BUN SERPL-MCNC: 6 MG/DL — LOW (ref 7–23)
CALCIUM SERPL-MCNC: 8.1 MG/DL — LOW (ref 8.4–10.5)
CHLORIDE SERPL-SCNC: 103 MMOL/L — SIGNIFICANT CHANGE UP (ref 98–107)
CO2 SERPL-SCNC: 24 MMOL/L — SIGNIFICANT CHANGE UP (ref 22–31)
COLOR SPEC: SIGNIFICANT CHANGE UP
CREAT SERPL-MCNC: 0.5 MG/DL — SIGNIFICANT CHANGE UP (ref 0.5–1.3)
EOSINOPHIL # BLD AUTO: 0.04 K/UL — SIGNIFICANT CHANGE UP (ref 0–0.5)
EOSINOPHIL NFR BLD AUTO: 1.8 % — SIGNIFICANT CHANGE UP (ref 0–6)
GLUCOSE SERPL-MCNC: 109 MG/DL — HIGH (ref 70–99)
GLUCOSE UR-MCNC: NEGATIVE — SIGNIFICANT CHANGE UP
HCT VFR BLD CALC: 32.8 % — LOW (ref 34.5–45)
HGB BLD-MCNC: 10.7 G/DL — LOW (ref 11.5–15.5)
IMM GRANULOCYTES # BLD AUTO: 0.01 # — SIGNIFICANT CHANGE UP
IMM GRANULOCYTES NFR BLD AUTO: 0.5 % — SIGNIFICANT CHANGE UP (ref 0–1.5)
KETONES UR-MCNC: NEGATIVE — SIGNIFICANT CHANGE UP
LEUKOCYTE ESTERASE UR-ACNC: NEGATIVE — SIGNIFICANT CHANGE UP
LYMPHOCYTES # BLD AUTO: 0.75 K/UL — LOW (ref 1–3.3)
LYMPHOCYTES # BLD AUTO: 33.9 % — SIGNIFICANT CHANGE UP (ref 13–44)
MAGNESIUM SERPL-MCNC: 2 MG/DL — SIGNIFICANT CHANGE UP (ref 1.6–2.6)
MANUAL SMEAR VERIFICATION: SIGNIFICANT CHANGE UP
MCHC RBC-ENTMCNC: 32.1 PG — SIGNIFICANT CHANGE UP (ref 27–34)
MCHC RBC-ENTMCNC: 32.6 % — SIGNIFICANT CHANGE UP (ref 32–36)
MCV RBC AUTO: 98.5 FL — SIGNIFICANT CHANGE UP (ref 80–100)
MONOCYTES # BLD AUTO: 0.36 K/UL — SIGNIFICANT CHANGE UP (ref 0–0.9)
MONOCYTES NFR BLD AUTO: 16.3 % — HIGH (ref 2–14)
MUCOUS THREADS # UR AUTO: SIGNIFICANT CHANGE UP
NEUTROPHILS # BLD AUTO: 1.05 K/UL — LOW (ref 1.8–7.4)
NEUTROPHILS NFR BLD AUTO: 47.5 % — SIGNIFICANT CHANGE UP (ref 43–77)
NITRITE UR-MCNC: NEGATIVE — SIGNIFICANT CHANGE UP
NRBC # FLD: 0 — SIGNIFICANT CHANGE UP
NT-PROBNP SERPL-SCNC: 200.9 PG/ML — SIGNIFICANT CHANGE UP
PH UR: 6 — SIGNIFICANT CHANGE UP (ref 4.6–8)
PHOSPHATE SERPL-MCNC: 2.7 MG/DL — SIGNIFICANT CHANGE UP (ref 2.5–4.5)
PLATELET # BLD AUTO: 188 K/UL — SIGNIFICANT CHANGE UP (ref 150–400)
PMV BLD: 9.8 FL — SIGNIFICANT CHANGE UP (ref 7–13)
POTASSIUM SERPL-MCNC: 3.8 MMOL/L — SIGNIFICANT CHANGE UP (ref 3.5–5.3)
POTASSIUM SERPL-SCNC: 3.8 MMOL/L — SIGNIFICANT CHANGE UP (ref 3.5–5.3)
PROT UR-MCNC: NEGATIVE MG/DL — SIGNIFICANT CHANGE UP
RBC # BLD: 3.33 M/UL — LOW (ref 3.8–5.2)
RBC # FLD: 14.7 % — HIGH (ref 10.3–14.5)
RBC CASTS # UR COMP ASSIST: SIGNIFICANT CHANGE UP (ref 0–?)
SODIUM SERPL-SCNC: 137 MMOL/L — SIGNIFICANT CHANGE UP (ref 135–145)
SP GR SPEC: 1.02 — SIGNIFICANT CHANGE UP (ref 1–1.04)
SPECIMEN SOURCE: SIGNIFICANT CHANGE UP
SPECIMEN SOURCE: SIGNIFICANT CHANGE UP
UROBILINOGEN FLD QL: NORMAL MG/DL — SIGNIFICANT CHANGE UP
WBC # BLD: 2.21 K/UL — LOW (ref 3.8–10.5)
WBC # FLD AUTO: 2.21 K/UL — LOW (ref 3.8–10.5)
WBC UR QL: SIGNIFICANT CHANGE UP (ref 0–?)

## 2018-01-23 PROCEDURE — 99223 1ST HOSP IP/OBS HIGH 75: CPT | Mod: GC

## 2018-01-23 PROCEDURE — 99222 1ST HOSP IP/OBS MODERATE 55: CPT | Mod: GC

## 2018-01-23 RX ORDER — SODIUM CHLORIDE 9 MG/ML
500 INJECTION INTRAMUSCULAR; INTRAVENOUS; SUBCUTANEOUS ONCE
Qty: 0 | Refills: 0 | Status: COMPLETED | OUTPATIENT
Start: 2018-01-23 | End: 2018-01-23

## 2018-01-23 RX ORDER — PIPERACILLIN AND TAZOBACTAM 4; .5 G/20ML; G/20ML
3.38 INJECTION, POWDER, LYOPHILIZED, FOR SOLUTION INTRAVENOUS EVERY 8 HOURS
Qty: 0 | Refills: 0 | Status: DISCONTINUED | OUTPATIENT
Start: 2018-01-23 | End: 2018-01-25

## 2018-01-23 RX ORDER — MONTELUKAST 4 MG/1
10 TABLET, CHEWABLE ORAL DAILY
Qty: 0 | Refills: 0 | Status: DISCONTINUED | OUTPATIENT
Start: 2018-01-23 | End: 2018-01-25

## 2018-01-23 RX ORDER — BUDESONIDE AND FORMOTEROL FUMARATE DIHYDRATE 160; 4.5 UG/1; UG/1
2 AEROSOL RESPIRATORY (INHALATION)
Qty: 0 | Refills: 0 | Status: DISCONTINUED | OUTPATIENT
Start: 2018-01-23 | End: 2018-01-25

## 2018-01-23 RX ORDER — SODIUM CHLORIDE 9 MG/ML
4 INJECTION INTRAMUSCULAR; INTRAVENOUS; SUBCUTANEOUS DAILY
Qty: 0 | Refills: 0 | Status: DISCONTINUED | OUTPATIENT
Start: 2018-01-23 | End: 2018-01-25

## 2018-01-23 RX ORDER — IPRATROPIUM/ALBUTEROL SULFATE 18-103MCG
3 AEROSOL WITH ADAPTER (GRAM) INHALATION EVERY 6 HOURS
Qty: 0 | Refills: 0 | Status: DISCONTINUED | OUTPATIENT
Start: 2018-01-23 | End: 2018-01-25

## 2018-01-23 RX ORDER — CEFEPIME 1 G/1
2000 INJECTION, POWDER, FOR SOLUTION INTRAMUSCULAR; INTRAVENOUS EVERY 8 HOURS
Qty: 0 | Refills: 0 | Status: DISCONTINUED | OUTPATIENT
Start: 2018-01-23 | End: 2018-01-23

## 2018-01-23 RX ORDER — SODIUM CHLORIDE 9 MG/ML
1000 INJECTION INTRAMUSCULAR; INTRAVENOUS; SUBCUTANEOUS
Qty: 0 | Refills: 0 | Status: DISCONTINUED | OUTPATIENT
Start: 2018-01-23 | End: 2018-01-24

## 2018-01-23 RX ORDER — ENOXAPARIN SODIUM 100 MG/ML
40 INJECTION SUBCUTANEOUS DAILY
Qty: 0 | Refills: 0 | Status: DISCONTINUED | OUTPATIENT
Start: 2018-01-23 | End: 2018-01-25

## 2018-01-23 RX ORDER — SIMETHICONE 80 MG/1
80 TABLET, CHEWABLE ORAL ONCE
Qty: 0 | Refills: 0 | Status: COMPLETED | OUTPATIENT
Start: 2018-01-23 | End: 2018-01-23

## 2018-01-23 RX ORDER — ACETAMINOPHEN 500 MG
650 TABLET ORAL ONCE
Qty: 0 | Refills: 0 | Status: COMPLETED | OUTPATIENT
Start: 2018-01-23 | End: 2018-01-23

## 2018-01-23 RX ORDER — SODIUM CHLORIDE 9 MG/ML
1000 INJECTION INTRAMUSCULAR; INTRAVENOUS; SUBCUTANEOUS ONCE
Qty: 0 | Refills: 0 | Status: COMPLETED | OUTPATIENT
Start: 2018-01-23 | End: 2018-01-23

## 2018-01-23 RX ORDER — VANCOMYCIN HCL 1 G
1000 VIAL (EA) INTRAVENOUS EVERY 12 HOURS
Qty: 0 | Refills: 0 | Status: DISCONTINUED | OUTPATIENT
Start: 2018-01-23 | End: 2018-01-23

## 2018-01-23 RX ADMIN — ENOXAPARIN SODIUM 40 MILLIGRAM(S): 100 INJECTION SUBCUTANEOUS at 13:50

## 2018-01-23 RX ADMIN — SIMETHICONE 80 MILLIGRAM(S): 80 TABLET, CHEWABLE ORAL at 06:25

## 2018-01-23 RX ADMIN — Medication 650 MILLIGRAM(S): at 21:38

## 2018-01-23 RX ADMIN — SODIUM CHLORIDE 75 MILLILITER(S): 9 INJECTION INTRAMUSCULAR; INTRAVENOUS; SUBCUTANEOUS at 15:17

## 2018-01-23 RX ADMIN — SODIUM CHLORIDE 500 MILLILITER(S): 9 INJECTION INTRAMUSCULAR; INTRAVENOUS; SUBCUTANEOUS at 02:09

## 2018-01-23 RX ADMIN — Medication 100 MILLIGRAM(S): at 13:49

## 2018-01-23 RX ADMIN — SODIUM CHLORIDE 1000 MILLILITER(S): 9 INJECTION INTRAMUSCULAR; INTRAVENOUS; SUBCUTANEOUS at 01:13

## 2018-01-23 RX ADMIN — BUDESONIDE AND FORMOTEROL FUMARATE DIHYDRATE 2 PUFF(S): 160; 4.5 AEROSOL RESPIRATORY (INHALATION) at 12:29

## 2018-01-23 RX ADMIN — MONTELUKAST 10 MILLIGRAM(S): 4 TABLET, CHEWABLE ORAL at 13:50

## 2018-01-23 RX ADMIN — Medication 40 MILLIGRAM(S): at 06:25

## 2018-01-23 RX ADMIN — Medication 100 MILLIGRAM(S): at 22:00

## 2018-01-23 RX ADMIN — Medication 3 MILLILITER(S): at 12:26

## 2018-01-23 RX ADMIN — SODIUM CHLORIDE 75 MILLILITER(S): 9 INJECTION INTRAMUSCULAR; INTRAVENOUS; SUBCUTANEOUS at 03:59

## 2018-01-23 NOTE — ED ADULT NURSE REASSESSMENT NOTE - NS ED NURSE REASSESS COMMENT FT1
Pt received at 1:30 am: Report received from LEONEL Haas. Pt A&Ox4, denies pain/ discomfort at this time. Fluids infusing as ordered. Pt given urine sample cup and instructed on providing sample. Pt admitted and awaiting bed assignment. Will continue to monitor closely.

## 2018-01-23 NOTE — H&P ADULT - HISTORY OF PRESENT ILLNESS
77F Mandarin speaking [facility] breast CA, lung CA (s/p lobectomy), ovarian CA (s/p surgery, currently undergoing chemotherapy), asthma, presents with 1 month of worsening cough and pleuritic chest pain.  Cough productive of white sputum, + wheezing.  Also has had worsening sore throat with cough.  +low grade fevers at home.  Was at her PCPs office today who sent her to the ED given fevers and immunocompromised state as is on chemotherapy (patient unsure of which chemo she is on, she does not know the name of her oncologist, says she is in [place]).  Denies nausea/vomiting, no diarrhea.  No dysuria but patient reports stress incontinence (has leaking when coughing), wears diapers.  Has R sided chest wall port.      In the ED .4 105 (96 - 144) / (54-68) 18 99% RA   Labs notable for leukopenia WBC 2.44, Hgb 10.6, ANC 1100.  Na 133, Cr 0.61 Troponin negative x 1.  RVP negative, Blood cultures pending, UA / Urine culture pending.    CTA Chest done shows no pulmonary embolism, opacities in RUL and RML suggestive of prior infection, bilateral pleural calcifications, upper abdominal ascites   Patient given vancomycin x 1, Zosyn x 1 in the ED, 1.5 L NaCl, tylenol, hycoden 77F Mandarin speaking PMH breast CA, lung CA (s/p lobectomy), ovarian CA (s/p surgery, currently undergoing chemotherapy), asthma, presents with 1 month of worsening cough and pleuritic chest pain.  Cough productive of white sputum, + wheezing.  Also has had worsening sore throat with cough.  Said that she has had increased HINOJOSA and has worsening limitation to the amount of distance that she can currently walk.  Has frequent nighttime awakenings from coughing/SOB.  +low grade fevers at home.  Was at her PCPs office today who sent her to the ED given fevers and immunocompromised state as is on chemotherapy (patient unsure of which chemo she is on, she does not know the name of her oncologist, says she is in [place]).  Denies nausea/vomiting, no diarrhea.  No dysuria but patient reports stress incontinence (has leaking when coughing), wears diapers.  Has R sided chest wall port.      In the ED .4 105 (96 - 144) / (54-68) 18 99% RA   Labs notable for leukopenia WBC 2.44, Hgb 10.6, ANC 1100.  Na 133, Cr 0.61 Troponin negative x 1.  RVP negative, Blood cultures pending, UA / Urine culture pending.    CTA Chest done shows no pulmonary embolism, opacities in RUL and RML suggestive of prior infection, bilateral pleural calcifications, upper abdominal ascites   Patient given vancomycin x 1, Zosyn x 1 in the ED, 1.5 L NaCl, tylenol, hycoden 77F Mandarin speaking PMH breast CA, lung CA (s/p lobectomy), ovarian CA (s/p surgery, currently undergoing chemotherapy), asthma, presents with 1 month of worsening cough and pleuritic chest pain.  Cough productive of white sputum, + wheezing.  Also has had worsening sore throat with cough.  Said that she has had increased HINOJOSA and has worsening limitation to the amount of distance that she can currently walk.  Has frequent nighttime awakenings from coughing/SOB.  +low grade fevers at home throughout this time.  Was at her PCPs office today who sent her to the ED given fevers and immunocompromised state as is on chemotherapy (patient unsure of which chemo she is on, she does not know the name of her oncologist, says she is in [place]).  Denies nausea/vomiting, no diarrhea.  No dysuria but patient reports stress incontinence (has leaking when coughing), wears diapers.  Has R sided chest wall port.      In the ED .4 105 (96 - 144) / (54-68) 18 99% RA   Labs notable for leukopenia WBC 2.44, Hgb 10.6, ANC 1100.  Na 133, Cr 0.61 Troponin negative x 1.  RVP negative, Blood cultures pending, UA / Urine culture pending.    CTA Chest done shows no pulmonary embolism, opacities in RUL and RML suggestive of prior infection, bilateral pleural calcifications, upper abdominal ascites   Patient given vancomycin x 1, Zosyn x 1 in the ED, 1.5 L NaCl, tylenol, hycoden

## 2018-01-23 NOTE — CHART NOTE - NSCHARTNOTEFT_GEN_A_CORE
At length discussion with patient via  [facility] [id number]. Informed by RN Carmita that patient wants to leave AMA. Spoke with patient and she stated she wants to leave because she is hungry, constipated and has a cough and no one is helping her. Explained to patient we are helping her and addressing her concerns. Patient explained to writer she will call her PCP and ask him for his advice. Dr. Henderson aware and will reach out to patient's PCP.

## 2018-01-23 NOTE — H&P ADULT - PROBLEM SELECTOR PLAN 7
- patient does not know her medications, has list at home, told patient to tell  to bring it in or can call pharmacy ([facility]) in AM.

## 2018-01-23 NOTE — CONSULT NOTE ADULT - ASSESSMENT
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  -Hold steroids and maintenance inhalers, pt has no hx of obstructive lung dz  -Would speak to CTSx regarding prior surgical intervention, whether there is change in imaging, and if there was hx of TB   -Will continue to follow

## 2018-01-23 NOTE — H&P ADULT - PROBLEM SELECTOR PLAN 1
- meets SIRS criteria with leukopenia, tachycardia, and fever in the setting of immunocompromised state   - unclear etiology - f/u blood cultures, UA, urine culture  - RVP negative   - chest wall port does not appear infected   - s/p vanc/zosyn in the ED, will continue with vancomycin / cefepime - meets SIRS criteria with leukopenia, tachycardia, and fever in the setting of immunocompromised state   - unclear etiology - f/u blood cultures, UA, urine culture  - RVP negative   - chest wall port does not appear infected   - s/p vanc/zosyn in the ED, will observe off antibiotics as low concern for infection given no symptoms, has been having fevers on and off for 1 month, likely 2/2 malignancy - meets SIRS criteria with leukopenia, tachycardia, and fever in the setting of immunocompromised state   - unclear etiology though suspect it might be related to her malignancy - f/u blood cultures, UA, urine culture  - RVP negative   - chest wall port does not appear infected   - s/p vanc/zosyn in the ED, will observe off antibiotics as low concern for infection given no symptoms, has been having fevers on and off for 1 month, likely 2/2 malignancy

## 2018-01-23 NOTE — H&P ADULT - PROBLEM SELECTOR PLAN 2
- pt reports worsening cough, chest tightness, wheezing  - RVP negative   - home meds include albuterol inhaler, combivent, advair, montelukast  - c/w duonebs every 6 hours, advair, montelukast - pt reports worsening cough, chest tightness, wheezing  - RVP negative   - home meds include albuterol inhaler, combivent, advair, montelukast  - c/w duonebs every 6 hours, advair, montelukast, prednisone 40 mg daily

## 2018-01-23 NOTE — CONSULT NOTE ADULT - ASSESSMENT
77F Mandarin speaking PMH breast CA, lung CA (s/p lobectomy), ovarian CA (s/p surgery, currently undergoing chemotherapy), asthma, presents with 1 month of worsening cough and pleuritic chest pain. Patient with fever and leukopenia. Also on recent chemo for ovarian cancer. Ct chest evident for RUL and RML patchy opacities.     Recommend  --FU Blood and urine cultures 77F Mandarin speaking [facility] breast CA, lung CA (s/p lobectomy), ovarian CA (s/p surgery, currently undergoing chemotherapy), asthma, presents with 1 month of worsening cough and pleuritic chest pain. Patient with fever and leukopenia. Also on recent chemo for ovarian cancer. Ct chest evident for RUL and RML patchy opacities.     Recommend  --FU Blood and urine cultures  --CT chest comparable with prior CT scan-obtained from [facility]  --Less likely a new [pneumonia  --Monitor off antibiotics 77F Mandarin speaking PMH breast CA, lung CA (s/p lobectomy), ovarian CA (s/p surgery, currently undergoing chemotherapy), asthma, presents with 1 month of worsening cough and pleuritic chest pain. Patient with fever and leukopenia. Also on recent chemo for ovarian cancer. Ct chest evident for RUL and RML patchy opacities.     Recommend  --FU Blood and urine cultures  --CT chest comparable with prior CT scan-obtained from [facility]  --Less likely a new [pneumonia but need more data 77F Mandarin speaking [facility] breast CA, lung CA (s/p lobectomy), ovarian CA (s/p surgery, currently undergoing chemotherapy), asthma, presents with 1 month of worsening cough and pleuritic chest pain. Patient with fever and leukopenia. Also on recent chemo for ovarian cancer. Ct chest evident for RUL and RML patchy opacities.     Recommend  --FU Blood and urine cultures  --CT chest comparable with prior CT scan-obtained from [facility]  --Less likely a new pneumonia but need more data--however given RUL opacity and patient with constitutional symptoms, cough and fever would raise possibility of TB  -Sputum for AFB x3  -Airborne isolation  -Quant gold  -Cover with Empiric Zosyn 3.375 q8 for now

## 2018-01-23 NOTE — H&P ADULT - NSHPREVIEWOFSYSTEMS_GEN_ALL_CORE
CONSTITUTIONAL: +fever, no weight loss   EYES: No eye pain, visual disturbances, or discharge  ENMT: +throat pain.  No difficulty hearing, tinnitus, vertigo  RESPIRATORY: +productive cough, +wheezing  CARDIOVASCULAR: +pleuritic chest pain, No palpitations, dizziness, or leg swelling  GASTROINTESTINAL: No abdominal or epigastric pain. No nausea, vomiting, or hematemesis; No diarrhea or constipation. No melena or hematochezia.  GENITOURINARY: No dysuria, frequency, hematuria. +stress incontinence   NEUROLOGICAL: No headaches, loss of strength, numbness, or tremors  SKIN: No itching, burning, rashes, or lesions   LYMPH NODES: No enlarged glands  HEME/LYMPH: No easy bruising, or bleeding gums  ALLERGY AND IMMUNOLOGIC: No hives or eczema

## 2018-01-23 NOTE — CONSULT NOTE ADULT - SUBJECTIVE AND OBJECTIVE BOX
Patient is a 77y old  Female who presents with a chief complaint of fever, cough (23 Jan 2018 03:51)    HPI:  77F Mandarin speaking PMH breast CA, lung CA (s/p lobectomy), ovarian CA (s/p surgery, currently undergoing chemotherapy), asthma, presents with 1 month of worsening cough and pleuritic chest pain.  Cough productive of white sputum, + wheezing.  Also has had worsening sore throat with cough.  Said that she has had increased HINOJOSA and has worsening limitation to the amount of distance that she can currently walk.  Has frequent nighttime awakenings from coughing/SOB.  +low grade fevers at home throughout this time.  Was at her PCPs office today who sent her to the ED given fevers and immunocompromised state as is on chemotherapy (patient unsure of which chemo she is on, she does not know the name of her oncologist, says she is in [place]).  Denies nausea/vomiting, no diarrhea.  No dysuria but patient reports stress incontinence (has leaking when coughing), wears diapers.  Has R sided chest wall port.      In the ED .4 105 (96 - 144) / (54-68) 18 99% RA   Labs notable for leukopenia WBC 2.44, Hgb 10.6, ANC 1100.  Na 133, Cr 0.61 Troponin negative x 1.  RVP negative, Blood cultures pending, UA / Urine culture pending.    CTA Chest done shows no pulmonary embolism, opacities in RUL and RML suggestive of prior infection, bilateral pleural calcifications, upper abdominal ascites   Patient given vancomycin x 1, Zosyn x 1 in the ED, 1.5 L NaCl, tylenol, hycoden (23 Jan 2018 03:51)      PAST MEDICAL & SURGICAL HISTORY:  Pelvic mass in female: diagnosed via PET/CT in 2/2017  Headache  Lung mass  Hyperlipidemia  Hepatitis B  Breast cancer: 2007 Left Mastectomy  Status post lobectomy of lung: Left Low Lobe 2016  History of lumpectomy: L Breast, 2007      Social history:  Non smoker    FAMILY HISTORY:  No pertinent family history in first degree relatives    Allergies    Allergy Status Unknown  No Known Allergies    Intolerances        Antimicrobials:          Vital Signs Last 24 Hrs  T(C): 37 (23 Jan 2018 10:48), Max: 38 (22 Jan 2018 15:30)  T(F): 98.6 (23 Jan 2018 10:48), Max: 100.4 (22 Jan 2018 15:30)  HR: 70 (23 Jan 2018 12:26) (70 - 105)  BP: 151/56 (23 Jan 2018 10:48) (96/54 - 151/56)  BP(mean): --  RR: 18 (23 Jan 2018 07:11) (18 - 19)  SpO2: 98% (23 Jan 2018 07:11) (96% - 100%)                              10.7   2.21  )-----------( 188      ( 23 Jan 2018 07:00 )             32.8         01-23    137  |  103  |  6<L>  ----------------------------<  109<H>  3.8   |  24  |  0.50    Ca    8.1<L>      23 Jan 2018 07:00  Phos  2.7     01-23  Mg     2.0     01-23    TPro  6.8  /  Alb  3.4  /  TBili  0.5  /  DBili  x   /  AST  15  /  ALT  10  /  AlkPhos  67  01-22      RECENT CULTURES: Patient is a 77y old  Female who presents with a chief complaint of fever, cough (23 Jan 2018 03:51)    HPI:  77F Mandarin speaking PMH breast CA, lung CA (s/p lobectomy), ovarian CA (s/p surgery, currently undergoing chemotherapy), asthma, presents with 1 month of worsening cough and pleuritic chest pain.  Cough productive of white sputum, + wheezing.  Also has had worsening sore throat with cough.  Said that she has had increased HINOJOSA and has worsening limitation to the amount of distance that she can currently walk.  Has frequent nighttime awakenings from coughing/SOB.  +low grade fevers at home throughout this time.  Was at her PCPs office today who sent her to the ED given fevers and immunocompromised state as is on chemotherapy (patient unsure of which chemo she is on, she does not know the name of her oncologist, says she is in [place]).  Denies nausea/vomiting, no diarrhea.  No dysuria but patient reports stress incontinence (has leaking when coughing), wears diapers.  Has R sided chest wall port.      In the ED .4 105 (96 - 144) / (54-68) 18 99% RA   Labs notable for leukopenia WBC 2.44, Hgb 10.6, ANC 1100.  Na 133, Cr 0.61 Troponin negative x 1.  RVP negative, Blood cultures pending, UA / Urine culture pending.    CTA Chest done shows no pulmonary embolism, opacities in RUL and RML suggestive of prior infection, bilateral pleural calcifications, upper abdominal ascites   Patient given vancomycin x 1, Zosyn x 1 in the ED, 1.5 L NaCl, tylenol, hycoden (23 Jan 2018 03:51)      PAST MEDICAL & SURGICAL HISTORY:  Pelvic mass in female: diagnosed via PET/CT in 2/2017  Headache  Lung mass  Hyperlipidemia  Hepatitis B  Breast cancer: 2007 Left Mastectomy  Status post lobectomy of lung: Left Low Lobe 2016  History of lumpectomy: L Breast, 2007      Social history:  Non smoker    FAMILY HISTORY:  No pertinent family history in first degree relatives    Allergies    Allergy Status Unknown  No Known Allergies    Intolerances        Antimicrobials:          Vital Signs Last 24 Hrs  T(C): 37 (23 Jan 2018 10:48), Max: 38 (22 Jan 2018 15:30)  T(F): 98.6 (23 Jan 2018 10:48), Max: 100.4 (22 Jan 2018 15:30)  HR: 70 (23 Jan 2018 12:26) (70 - 105)  BP: 151/56 (23 Jan 2018 10:48) (96/54 - 151/56)  BP(mean): --  RR: 18 (23 Jan 2018 07:11) (18 - 19)  SpO2: 98% (23 Jan 2018 07:11) (96% - 100%)                              10.7   2.21  )-----------( 188      ( 23 Jan 2018 07:00 )             32.8         01-23    137  |  103  |  6<L>  ----------------------------<  109<H>  3.8   |  24  |  0.50    Ca    8.1<L>      23 Jan 2018 07:00  Phos  2.7     01-23  Mg     2.0     01-23    TPro  6.8  /  Alb  3.4  /  TBili  0.5  /  DBili  x   /  AST  15  /  ALT  10  /  AlkPhos  67  01-22      RECENT CULTURES:    RADIOLOGY  CT Angio Chest w/ IV Cont (01.22.18 @ 17:42) >  1.  No pulmonary embolism.  2.  The volume loss and opacities in the right upper lobe and right   middle lobe could be the sequelae of prior infection or inflammation   given the pleural calcifications and narrowing of the upper and lobar   pulmonary arteries.   3.  Bilateral pleural calcifications and pleural thickening suggestive of   prior infection or inflammation.  4.  Small calcified mediastinal lymph nodes and calcified pericardium may   be the sequelae of prior infection.  5.  New moderate upper abdominal ascites. Patient is a 77y old  Female who presents with a chief complaint of fever, cough (23 Jan 2018 03:51)    HPI:  77F Mandarin speaking PMH breast CA, lung CA (s/p lobectomy), ovarian CA (s/p surgery, currently undergoing chemotherapy), asthma, presents with 1 month of worsening cough and pleuritic chest pain.  Cough productive of white sputum, + wheezing.  Also has had worsening sore throat with cough.  Said that she has had increased HINOJOSA and has worsening limitation to the amount of distance that she can currently walk.  Has frequent nighttime awakenings from coughing/SOB.  +low grade fevers at home throughout this time.  Was at her PCPs office today who sent her to the ED given fevers and immunocompromised state as is on chemotherapy (patient unsure of which chemo she is on, she does not know the name of her oncologist, says she is in [place]).  Denies nausea/vomiting, no diarrhea.  No dysuria but patient reports stress incontinence (has leaking when coughing), wears diapers.  Has R sided chest wall port.      In the ED .4 105 (96 - 144) / (54-68) 18 99% RA   Labs notable for leukopenia WBC 2.44, Hgb 10.6, ANC 1100.  Na 133, Cr 0.61 Troponin negative x 1.  RVP negative, Blood cultures pending, UA / Urine culture pending.    CTA Chest done shows no pulmonary embolism, opacities in RUL and RML suggestive of prior infection, bilateral pleural calcifications, upper abdominal ascites   Patient given vancomycin x 1, Cefepime x 1 in the ED, 1.5 L NaCl, tylenol, hycoden (23 Jan 2018 03:51)      PAST MEDICAL & SURGICAL HISTORY:  Pelvic mass in female: diagnosed via PET/CT in 2/2017  Headache  Lung mass  Hyperlipidemia  Hepatitis B  Breast cancer: 2007 Left Mastectomy  Status post lobectomy of lung: Left Low Lobe 2016  History of lumpectomy: L Breast, 2007      Social history:  Non smoker  From [place]  No hx of TB exposure, or TB in family members    FAMILY HISTORY:  No pertinent family history in first degree relatives    Allergies    Allergy Status Unknown  No Known Allergies    Intolerances        Antimicrobials:    Off      Vital Signs Last 24 Hrs  T(C): 37 (23 Jan 2018 10:48), Max: 38 (22 Jan 2018 15:30)  T(F): 98.6 (23 Jan 2018 10:48), Max: 100.4 (22 Jan 2018 15:30)  HR: 70 (23 Jan 2018 12:26) (70 - 105)  BP: 151/56 (23 Jan 2018 10:48) (96/54 - 151/56)  BP(mean): --  RR: 18 (23 Jan 2018 07:11) (18 - 19)  SpO2: 98% (23 Jan 2018 07:11) (96% - 100%)                              10.7   2.21  )-----------( 188      ( 23 Jan 2018 07:00 )             32.8         01-23    137  |  103  |  6<L>  ----------------------------<  109<H>  3.8   |  24  |  0.50    Ca    8.1<L>      23 Jan 2018 07:00  Phos  2.7     01-23  Mg     2.0     01-23    TPro  6.8  /  Alb  3.4  /  TBili  0.5  /  DBili  x   /  AST  15  /  ALT  10  /  AlkPhos  67  01-22      RECENT CULTURES:    RADIOLOGY  CT Angio Chest w/ IV Cont (01.22.18 @ 17:42) >  1.  No pulmonary embolism.  2.  The volume loss and opacities in the right upper lobe and right   middle lobe could be the sequelae of prior infection or inflammation   given the pleural calcifications and narrowing of the upper and lobar   pulmonary arteries.   3.  Bilateral pleural calcifications and pleural thickening suggestive of   prior infection or inflammation.  4.  Small calcified mediastinal lymph nodes and calcified pericardium may   be the sequelae of prior infection.  5.  New moderate upper abdominal ascites.

## 2018-01-23 NOTE — CONSULT NOTE ADULT - ATTENDING COMMENTS
Patient symptoms are very nonspecific (fevers, sore throat, productive cough) may represent viral illness, although her RVP is negative.  CT chest does not look very different when compared to one from february 2017. Would obtain full history from PCP, oncologist and surgeon regarding her TB history and treatment, considering that patient cannot provide details about her TB exposure or questionable illness.   I doubt that this was not addressed prior to initiation of chemo, we'll need this information in order to decide on further management.
77 year old with lung and ovarian cancer presents with fever, shortness of breath, and cough.    This may all be related to her malignancy, but she is immunocompromised.     I would prefer to cover with zosyn at this time.    RUL mass seems chronic but she does have calcified mediastinal nodes and is from an endemic area for Tb.  Check quantiferon.  Airborne isolation for now.   Check sputum afb.

## 2018-01-23 NOTE — H&P ADULT - ATTENDING COMMENTS
Pt seen and examined on 1/23/18, case discussed with Dr. Rahman, agree with assessment and plan as above.

## 2018-01-23 NOTE — H&P ADULT - PROBLEM SELECTOR PLAN 4
- history of chronic hepatitis B  - on entecavir - unclear if finished course - f/u with PCP in am - history of chronic hepatitis B  - was on entecavir - unclear if finished course - f/u with PCP in am

## 2018-01-23 NOTE — H&P ADULT - NSHPLABSRESULTS_GEN_ALL_CORE
LABS:                        10.6   2.44  )-----------( 181      ( 22 Jan 2018 14:35 )             32.9     22 Jan 2018 14:35    133    |  96     |  9      ----------------------------<  99     4.1     |  27     |  0.61     Ca    9.0        22 Jan 2018 14:35    TPro  6.8    /  Alb  3.4    /  TBili  0.5    /  DBili  x      /  AST  15     /  ALT  10     /  AlkPhos  67     22 Jan 2018 14:35    PT/INR - ( 22 Jan 2018 14:35 )   PT: 11.9 SEC;   INR: 1.07          PTT - ( 22 Jan 2018 14:35 )  PTT:25.7 SEC  CAPILLARY BLOOD GLUCOSE        BLOOD CULTURE - pending     RADIOLOGY & ADDITIONAL TESTS:    Imaging Personally Reviewed:  [X] YES     EKG sinus arrythmea LABS:                        10.6   2.44  )-----------( 181      ( 22 Jan 2018 14:35 )             32.9     22 Jan 2018 14:35    133    |  96     |  9      ----------------------------<  99     4.1     |  27     |  0.61     Ca    9.0        22 Jan 2018 14:35    TPro  6.8    /  Alb  3.4    /  TBili  0.5    /  DBili  x      /  AST  15     /  ALT  10     /  AlkPhos  67     22 Jan 2018 14:35    PT/INR - ( 22 Jan 2018 14:35 )   PT: 11.9 SEC;   INR: 1.07          PTT - ( 22 Jan 2018 14:35 )  PTT:25.7 SEC  CAPILLARY BLOOD GLUCOSE        BLOOD CULTURE - pending     RADIOLOGY & ADDITIONAL TESTS:    Imaging Personally Reviewed:  [X] YES     EKG sinus arrythmia

## 2018-01-23 NOTE — H&P ADULT - ASSESSMENT
77F Mandarin speaking PMH breast CA, lung CA (s/p lobectomy), ovarian CA (s/p surgery, currently undergoing chemotherapy), asthma, presents with 1 month of worsening cough and pleuritic chest pain. 77F Mandarin speaking PMH breast CA, lung CA (s/p lobectomy), ovarian CA (s/p surgery, currently undergoing chemotherapy), asthma, presents with 1 month of worsening cough and pleuritic chest pain most concerning for worsening asthma.

## 2018-01-23 NOTE — H&P ADULT - PROBLEM SELECTOR PLAN 3
- s/p surgery, currently undergoing chemotherapy  - f/u with PCP and oncologist in am regarding details of chemotherapy

## 2018-01-23 NOTE — CONSULT NOTE ADULT - SUBJECTIVE AND OBJECTIVE BOX
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 Pt further endorses HINOJOSA that has limited her functional independence (baseline ECOG 1). Overnight, pt started on Prednisone 40mg daily for 5 day course, and restarted on home Singulair and Symbicort.   Pt had PFTs performed 2016 prior to lung resection which showed FEV1 63%, FVC 76% AND FEVQ/FVC 85% with 6% bronchodilator response, TLC 92%, %, RV/%, and DLCO 56%      PAST MEDICAL & SURGICAL HISTORY:  Pelvic mass in female: diagnosed via PET/CT in 2017  Headache  Lung mass  Hyperlipidemia  Hepatitis B  Breast cancer: 2007 Left Mastectomy  Status post lobectomy of lung: Left Low Lobe   History of lumpectomy: L Breast,       FAMILY HISTORY:  Brother: Colon Cancer        SOCIAL HISTORY:  Smoking: [ x] Never Smoked [ ] Former Smoker (__ packs x ___ years) [ ] Current Smoker  (__ packs x ___ years)  Substance Use: [ x] Never Used [ ] Used ____  EtOH Use: Denies  Marital Status: [ ] Single [x ]  [ ]  [ ]   Sexual History: N/A    Allergies    Allergy Status Unknown  No Known Allergies    Intolerances    HOME MEDICATIONS:  Home Medications:  acetaminophen 325 mg oral tablet: 2 tab(s) orally every 6 hours, As needed, Mild Pain (27 Aug 2016 17:02)  acetaminophen-oxyCODONE 325 mg-5 mg oral tablet: 1 tab(s) orally every 4 hours, As needed, Moderate Pain (27 Aug 2016 17:02)  aspirin 81 mg oral tablet, chewable: 1 tab(s) orally once a day (27 Aug 2016 17:02)  B Complex 50: 1 tab(s) orally once a day. LAst day 16 (27 Aug 2016 17:02)  Calcium 600+D 600 mg-200 intl units oral tablet: 1 tab(s) orally once a day.  (27 Aug 2016 17:02)  docusate sodium 100 mg oral capsule: 1 cap(s) orally 3 times a day (27 Aug 2016 17:02)  montelukast 10 mg oral tablet: 1 tab(s) orally once a day. AM (27 Aug 2016 17:02)  polyethylene glycol 3350 oral powder for reconstitution: 17 gram(s) orally once a day, As needed, Constipation (27 Aug 2016 17:02)  Proventil HFA 90 mcg/inh inhalation aerosol: 2 puff(s) inhaled 4 times a day, As Needed (27 Aug 2016 17:02)  senna oral tablet: 2 tab(s) orally once a day (at bedtime) (27 Aug 2016 17:02)  Vitamin D3 2000 intl units oral capsule: 1 cap(s) orally once a day.  (27 Aug 2016 17:02)      REVIEW OF SYSTEMS:  Constitutional: [ ] negative [ ] fevers [ ] chills [ ] weight loss [ ] weight gain  HEENT: [ ] negative [ ] dry eyes [ ] eye irritation [ ] postnasal drip [ ] nasal congestion  CV: [ ] negative  [ ] chest pain [ ] orthopnea [ ] palpitations [ ] murmur  Resp: [ ] negative [ ] cough [ ] shortness of breath [ ] dyspnea [ ] wheezing [ ] sputum [ ] hemoptysis  GI: [ ] negative [ ] nausea [ ] vomiting [ ] diarrhea [ ] constipation [ ] abd pain [ ] dysphagia   : [ ] negative [ ] dysuria [ ] nocturia [ ] hematuria [ ] increased urinary frequency  Musculoskeletal: [ ] negative [ ] back pain [ ] myalgias [ ] arthralgias [ ] fracture  Skin: [ ] negative [ ] rash [ ] itch  Neurological: [ ] negative [ ] headache [ ] dizziness [ ] syncope [ ] weakness [ ] numbness  Psychiatric: [ ] negative [ ] anxiety [ ] depression  Endocrine: [ ] negative [ ] diabetes [ ] thyroid problem  Hematologic/Lymphatic: [ ] negative [ ] anemia [ ] bleeding problem  Allergic/Immunologic: [ ] negative [ ] itchy eyes [ ] nasal discharge [ ] hives [ ] angioedema  [ ] All other systems negative  [ ] Unable to assess ROS because ________    OBJECTIVE:  ICU Vital Signs Last 24 Hrs  T(C): 37.1 (2018 13:40), Max: 37.3 (2018 07:11)  T(F): 98.7 (2018 13:40), Max: 99.2 (2018 07:11)  HR: 83 (2018 13:40) (70 - 105)  BP: 119/63 (2018 13:40) (96/54 - 151/56)  BP(mean): --  ABP: --  ABP(mean): --  RR: 18 (2018 13:40) (18 - 19)  SpO2: 96% (2018 13:40) (96% - 99%)        CAPILLARY BLOOD GLUCOSE          PHYSICAL EXAM:  General:   HEENT:   Lymph Nodes:  Neck:   Respiratory:   Cardiovascular:   Abdomen:   Extremities:   Skin:   Neurological:  Psychiatry:    HOSPITAL MEDICATIONS:  Standing Meds:  ALBUTerol/ipratropium for Nebulization 3 milliLiter(s) Nebulizer every 6 hours  benzonatate 100 milliGRAM(s) Oral daily  buDESOnide  80 MICROgram(s)/formoterol 4.5 MICROgram(s) Inhaler 2 Puff(s) Inhalation two times a day  enoxaparin Injectable 40 milliGRAM(s) SubCutaneous daily  montelukast 10 milliGRAM(s) Oral daily  predniSONE   Tablet 40 milliGRAM(s) Oral daily  sodium chloride 0.9%. 1000 milliLiter(s) IV Continuous <Continuous>  sodium chloride 3%  Inhalation 4 milliLiter(s) Inhalation daily      PRN Meds:  guaiFENesin    Syrup 100 milliGRAM(s) Oral every 6 hours PRN      LABS:                        10.7   2.21  )-----------( 188      ( 2018 07:00 )             32.8     Hgb Trend: 10.7<--, 10.6<--  -23    137  |  103  |  6<L>  ----------------------------<  109<H>  3.8   |  24  |  0.50    Ca    8.1<L>      2018 07:00  Phos  2.7       Mg     2.0         TPro  6.8  /  Alb  3.4  /  TBili  0.5  /  DBili  x   /  AST  15  /  ALT  10  /  AlkPhos  67      Creatinine Trend: 0.50<--, 0.61<--  PT/INR - ( 2018 14:35 )   PT: 11.9 SEC;   INR: 1.07          PTT - ( 2018 14:35 )  PTT:25.7 SEC  Urinalysis Basic - ( 2018 03:00 )    Color: PLYEL / Appearance: CLEAR / S.023 / pH: 6.0  Gluc: NEGATIVE / Ketone: NEGATIVE  / Bili: NEGATIVE / Urobili: NORMAL mg/dL   Blood: NEGATIVE / Protein: NEGATIVE mg/dL / Nitrite: NEGATIVE   Leuk Esterase: NEGATIVE / RBC: 0-2 / WBC 2-5   Sq Epi: x / Non Sq Epi: x / Bacteria: x        Venous Blood Gas:   @ 14:35  7.41/49/31/29/53.4  VBG Lactate: 0.9    Lab work personally reviewed. CBC significant for leukopenia (WBC 2.21) with ANC 1.05. CMP unremarkable, pro-. UA (-).  RVP (-). Cx pending.    MICROBIOLOGY:       RADIOLOGY:  [X ] Reviewed and interpreted by me      EXAM: CT ANGIO CHEST (W)AW IC       PROCEDURE DATE: 2018         INTERPRETATION: CTA CHEST WITH CONTRAST (CT PULMONARY EMBOLUS)     INDICATION: Chest pain, fever, and cough.     TECHNIQUE: Unenhanced helical images were obtained of the chest. Coronal and   sagittal images were reconstructed. Images were obtained after the   uneventful administration of 90 cc of nonionic intravenous contrast   (Omnipaque 350). 10 cc of nonionic intravenous contrast (Omnipaque 350) was   discarded. Maximum intensity projection images were generated.     COMPARISON: CT 2017. Chest radiograph 2016.     FINDINGS:     Pulmonary Artery: The main pulmonary artery measures 2.8 cm. There is no   main, central, lobar, segmental, or subsegmental pulmonary embolus.     Tubes/Lines: Right chest Mediport tip in the SVC.     Lungs And Airways: A 1.5 x 1.6 cm opacity is in the right upper lobe with   adjacent pleural thickening and calcification and right upper lobe volume   loss   There is right middle lobe volume loss with patchy linear and nodular   opacities which abuts the pleural calcifications. Subsegmental atelectasis   of the right lower lobe.     Bilateral lower lobe subsegmental atelectasis. Status post left lower   lobectomy. Calcifications of the left upper lobe and linear atelectasis or   scarring.     Pleura: No pneumothorax. No pleural effusions. Bilateral pleural   calcifications. The pleura is thickened.     Mediastinum: Small calcified an noncalcified mediastinal lymph nodes. Right   lobe thyroid calcifications in hypodense nodules. The esophagus is   unremarkable.     Heart and Vasculature: The heart is normal in size. There is no pericardial   effusion. Pericardial calcifications. There are coronary artery   calcifications.     Left-sided aortic arch and left-sided descending thoracic aorta.   Atheromatous disease of the aorta. There are tiny vessels within the   mediastinum likely secondary to bronchial artery vessels which supply the   upper lobe and middle lobe. The main pulmonary artery is normal in caliber.   The right upper lobe artery is small in caliber. The right middle lobar   artery is likely severely narrowed. The right middle lobe pulmonary vein is   not well visualized and is likely severely narrowed as well.     Upper Abdomen: Moderate upper abdominal ascites.     Bones And Soft Tissues: Degenerative change of the spine.     IMPRESSION:     1. No pulmonary embolism.   2. The volume loss and opacities in the right upper lobe and right middle   lobe could be the sequelae of prior infection or inflammation given the   pleural calcifications and narrowing of the upper and lobar pulmonary   arteries.   3. Bilateral pleural calcifications and pleural thickening suggestive of   prior infection or inflammation.   4. Small calcified mediastinal lymph nodes and calcified pericardium may be   the sequelae of prior infection.   5. New moderate upper abdominal ascites.     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 Pt further endorses HINOJOSA that has limited her functional independence (baseline ECOG 1). Overnight, pt started on Prednisone 40mg daily for 5 day course, and restarted on home Singulair and Symbicort.   Pt had PFTs performed 2016 prior to lung resection which showed FEV1 63%, FVC 76% AND FEVQ/FVC 85% with 6% bronchodilator response, TLC 92%, %, RV/%, and DLCO 56%  [employer] (Mandarin): [id number]    PAST MEDICAL & SURGICAL HISTORY:  Pelvic mass in female: diagnosed via PET/CT in 2017  Headache  Lung mass  Hyperlipidemia  Hepatitis B  Breast cancer:  Left Mastectomy  Status post lobectomy of lung: Left Low Lobe   History of lumpectomy: L Breast,       FAMILY HISTORY:  Brother: Colon Cancer        SOCIAL HISTORY:  Smoking: [ x] Never Smoked [ ] Former Smoker (__ packs x ___ years) [ ] Current Smoker  (__ packs x ___ years)  Substance Use: [ x] Never Used [ ] Used ____  EtOH Use: Denies  Marital Status: [ ] Single [x ]  [ ]  [ ]   Sexual History: N/A    Allergies    Allergy Status Unknown  No Known Allergies    Intolerances    HOME MEDICATIONS:  Home Medications:  acetaminophen 325 mg oral tablet: 2 tab(s) orally every 6 hours, As needed, Mild Pain (27 Aug 2016 17:02)  acetaminophen-oxyCODONE 325 mg-5 mg oral tablet: 1 tab(s) orally every 4 hours, As needed, Moderate Pain (27 Aug 2016 17:02)  aspirin 81 mg oral tablet, chewable: 1 tab(s) orally once a day (27 Aug 2016 17:02)  B Complex 50: 1 tab(s) orally once a day. LAst day 16 (27 Aug 2016 17:02)  Calcium 600+D 600 mg-200 intl units oral tablet: 1 tab(s) orally once a day.  (27 Aug 2016 17:02)  docusate sodium 100 mg oral capsule: 1 cap(s) orally 3 times a day (27 Aug 2016 17:02)  montelukast 10 mg oral tablet: 1 tab(s) orally once a day. AM (27 Aug 2016 17:02)  polyethylene glycol 3350 oral powder for reconstitution: 17 gram(s) orally once a day, As needed, Constipation (27 Aug 2016 17:02)  Proventil HFA 90 mcg/inh inhalation aerosol: 2 puff(s) inhaled 4 times a day, As Needed (27 Aug 2016 17:02)  senna oral tablet: 2 tab(s) orally once a day (at bedtime) (27 Aug 2016 17:02)  Vitamin D3 2000 intl units oral capsule: 1 cap(s) orally once a day.  (27 Aug 2016 17:02)      REVIEW OF SYSTEMS:  Constitutional: [ ] negative [ ] fevers [ ] chills [ ] weight loss [ ] weight gain  HEENT: [ ] negative [ ] dry eyes [ ] eye irritation [ ] postnasal drip [ ] nasal congestion  CV: [ ] negative  [ ] chest pain [ ] orthopnea [ ] palpitations [ ] murmur  Resp: [ ] negative [ ] cough [ ] shortness of breath [ ] dyspnea [ ] wheezing [ ] sputum [ ] hemoptysis  GI: [ ] negative [ ] nausea [ ] vomiting [ ] diarrhea [ ] constipation [ ] abd pain [ ] dysphagia   : [ ] negative [ ] dysuria [ ] nocturia [ ] hematuria [ ] increased urinary frequency  Musculoskeletal: [ ] negative [ ] back pain [ ] myalgias [ ] arthralgias [ ] fracture  Skin: [ ] negative [ ] rash [ ] itch  Neurological: [ ] negative [ ] headache [ ] dizziness [ ] syncope [ ] weakness [ ] numbness  Psychiatric: [ ] negative [ ] anxiety [ ] depression  Endocrine: [ ] negative [ ] diabetes [ ] thyroid problem  Hematologic/Lymphatic: [ ] negative [ ] anemia [ ] bleeding problem  Allergic/Immunologic: [ ] negative [ ] itchy eyes [ ] nasal discharge [ ] hives [ ] angioedema  [ ] All other systems negative  [ ] Unable to assess ROS because ________    OBJECTIVE:  ICU Vital Signs Last 24 Hrs  T(C): 37.1 (2018 13:40), Max: 37.3 (2018 07:11)  T(F): 98.7 (2018 13:40), Max: 99.2 (2018 07:11)  HR: 83 (2018 13:40) (70 - 105)  BP: 119/63 (2018 13:40) (96/54 - 151/56)  BP(mean): --  ABP: --  ABP(mean): --  RR: 18 (2018 13:40) (18 - 19)  SpO2: 96% (2018 13:40) (96% - 99%)        CAPILLARY BLOOD GLUCOSE          PHYSICAL EXAM:  General:   HEENT:   Lymph Nodes:  Neck:   Respiratory:   Cardiovascular:   Abdomen:   Extremities:   Skin:   Neurological:  Psychiatry:    HOSPITAL MEDICATIONS:  Standing Meds:  ALBUTerol/ipratropium for Nebulization 3 milliLiter(s) Nebulizer every 6 hours  benzonatate 100 milliGRAM(s) Oral daily  buDESOnide  80 MICROgram(s)/formoterol 4.5 MICROgram(s) Inhaler 2 Puff(s) Inhalation two times a day  enoxaparin Injectable 40 milliGRAM(s) SubCutaneous daily  montelukast 10 milliGRAM(s) Oral daily  predniSONE   Tablet 40 milliGRAM(s) Oral daily  sodium chloride 0.9%. 1000 milliLiter(s) IV Continuous <Continuous>  sodium chloride 3%  Inhalation 4 milliLiter(s) Inhalation daily      PRN Meds:  guaiFENesin    Syrup 100 milliGRAM(s) Oral every 6 hours PRN      LABS:                        10.7   2.21  )-----------( 188      ( 2018 07:00 )             32.8     Hgb Trend: 10.7<--, 10.6<--  -23    137  |  103  |  6<L>  ----------------------------<  109<H>  3.8   |  24  |  0.50    Ca    8.1<L>      2018 07:00  Phos  2.7       Mg     2.0         TPro  6.8  /  Alb  3.4  /  TBili  0.5  /  DBili  x   /  AST  15  /  ALT  10  /  AlkPhos  67      Creatinine Trend: 0.50<--, 0.61<--  PT/INR - ( 2018 14:35 )   PT: 11.9 SEC;   INR: 1.07          PTT - ( 2018 14:35 )  PTT:25.7 SEC  Urinalysis Basic - ( 2018 03:00 )    Color: PLYEL / Appearance: CLEAR / S.023 / pH: 6.0  Gluc: NEGATIVE / Ketone: NEGATIVE  / Bili: NEGATIVE / Urobili: NORMAL mg/dL   Blood: NEGATIVE / Protein: NEGATIVE mg/dL / Nitrite: NEGATIVE   Leuk Esterase: NEGATIVE / RBC: 0-2 / WBC 2-5   Sq Epi: x / Non Sq Epi: x / Bacteria: x        Venous Blood Gas:   @ 14:35  7.41/49/31/29/53.4  VBG Lactate: 0.9    Lab work personally reviewed. CBC significant for leukopenia (WBC 2.21) with ANC 1.05. CMP unremarkable, pro-. UA (-).  RVP (-). Cx pending.    MICROBIOLOGY:       RADIOLOGY:  [X ] Reviewed and interpreted by me      EXAM: CT ANGIO CHEST (W)AW IC       PROCEDURE DATE: 2018         INTERPRETATION: CTA CHEST WITH CONTRAST (CT PULMONARY EMBOLUS)     INDICATION: Chest pain, fever, and cough.     TECHNIQUE: Unenhanced helical images were obtained of the chest. Coronal and   sagittal images were reconstructed. Images were obtained after the   uneventful administration of 90 cc of nonionic intravenous contrast   (Omnipaque 350). 10 cc of nonionic intravenous contrast (Omnipaque 350) was   discarded. Maximum intensity projection images were generated.     COMPARISON: CT 2017. Chest radiograph 2016.     FINDINGS:     Pulmonary Artery: The main pulmonary artery measures 2.8 cm. There is no   main, central, lobar, segmental, or subsegmental pulmonary embolus.     Tubes/Lines: Right chest Mediport tip in the SVC.     Lungs And Airways: A 1.5 x 1.6 cm opacity is in the right upper lobe with   adjacent pleural thickening and calcification and right upper lobe volume   loss   There is right middle lobe volume loss with patchy linear and nodular   opacities which abuts the pleural calcifications. Subsegmental atelectasis   of the right lower lobe.     Bilateral lower lobe subsegmental atelectasis. Status post left lower   lobectomy. Calcifications of the left upper lobe and linear atelectasis or   scarring.     Pleura: No pneumothorax. No pleural effusions. Bilateral pleural   calcifications. The pleura is thickened.     Mediastinum: Small calcified an noncalcified mediastinal lymph nodes. Right   lobe thyroid calcifications in hypodense nodules. The esophagus is   unremarkable.     Heart and Vasculature: The heart is normal in size. There is no pericardial   effusion. Pericardial calcifications. There are coronary artery   calcifications.     Left-sided aortic arch and left-sided descending thoracic aorta.   Atheromatous disease of the aorta. There are tiny vessels within the   mediastinum likely secondary to bronchial artery vessels which supply the   upper lobe and middle lobe. The main pulmonary artery is normal in caliber.   The right upper lobe artery is small in caliber. The right middle lobar   artery is likely severely narrowed. The right middle lobe pulmonary vein is   not well visualized and is likely severely narrowed as well.     Upper Abdomen: Moderate upper abdominal ascites.     Bones And Soft Tissues: Degenerative change of the spine.     IMPRESSION:     1. No pulmonary embolism.   2. The volume loss and opacities in the right upper lobe and right middle   lobe could be the sequelae of prior infection or inflammation given the   pleural calcifications and narrowing of the upper and lobar pulmonary   arteries.   3. Bilateral pleural calcifications and pleural thickening suggestive of   prior infection or inflammation.   4. Small calcified mediastinal lymph nodes and calcified pericardium may be   the sequelae of prior infection.   5. New moderate upper abdominal ascites.     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 Pt further endorses HINOJOSA that has limited her functional independence (baseline ECOG 1). She says that she recently went to [place] and was traveling a lot, which may have contributed to her decline. She is concerned about the possible worsening of her cancer. Pt denies sick contacts or recent illnesses and is compliant with her medications. Overnight, pt started on Prednisone 40mg daily for 5 day course, and started on Singulair and Symbicort.   Of note, pt had PFTs performed 2016 prior to lung resection which showed FEV1 63%, FVC 76% AND FEVQ/FVC 85% with 6% bronchodilator response, TLC 92%, %, RV/%, and DLCO 56%. She has no known hx of asthma and dz'ed any dz of asthma in the past. Pt is not on any inhaler therapy at home and does not think that the current breathing treatments are benefiting her at all.   [employer] (Mandarin): Jessa [id number]    PAST MEDICAL & SURGICAL HISTORY:  Pelvic mass in female: diagnosed via PET/CT in 2017  Headache  Lung mass  Hyperlipidemia  Hepatitis B  Breast cancer: 2007 Left Mastectomy  Status post lobectomy of lung: Left Low Lobe 2016  History of lumpectomy: L Breast,       FAMILY HISTORY:  Brother: Colon Cancer        SOCIAL HISTORY:  Smoking: [ x] Never Smoked [ ] Former Smoker (__ packs x ___ years) [ ] Current Smoker  (__ packs x ___ years)  Substance Use: [ x] Never Used [ ] Used ____  EtOH Use: Denies  Marital Status: [ ] Single [x ]  [ ]  [ ]   Sexual History: N/A    Allergies    Allergy Status Unknown  No Known Allergies    Intolerances    HOME MEDICATIONS:  Home Medications:  acetaminophen 325 mg oral tablet: 2 tab(s) orally every 6 hours, As needed, Mild Pain (27 Aug 2016 17:02)  acetaminophen-oxyCODONE 325 mg-5 mg oral tablet: 1 tab(s) orally every 4 hours, As needed, Moderate Pain (27 Aug 2016 17:02)  aspirin 81 mg oral tablet, chewable: 1 tab(s) orally once a day (27 Aug 2016 17:02)  B Complex 50: 1 tab(s) orally once a day. LAst day 16 (27 Aug 2016 17:02)  Calcium 600+D 600 mg-200 intl units oral tablet: 1 tab(s) orally once a day.  (27 Aug 2016 17:02)  docusate sodium 100 mg oral capsule: 1 cap(s) orally 3 times a day (27 Aug 2016 17:02)  montelukast 10 mg oral tablet: 1 tab(s) orally once a day. AM (27 Aug 2016 17:02)  polyethylene glycol 3350 oral powder for reconstitution: 17 gram(s) orally once a day, As needed, Constipation (27 Aug 2016 17:02)  Proventil HFA 90 mcg/inh inhalation aerosol: 2 puff(s) inhaled 4 times a day, As Needed (27 Aug 2016 17:02)  senna oral tablet: 2 tab(s) orally once a day (at bedtime) (27 Aug 2016 17:02)  Vitamin D3 2000 intl units oral capsule: 1 cap(s) orally once a day.  (27 Aug 2016 17:02)      REVIEW OF SYSTEMS:  Constitutional: [ ] negative [ ] fevers [ ] chills [ ] weight loss [ ] weight gain  HEENT: [ ] negative [ ] dry eyes [ ] eye irritation [ ] postnasal drip [ ] nasal congestion  CV: [ ] negative  [ ] chest pain [ ] orthopnea [ ] palpitations [ ] murmur  Resp: [ ] negative [ ] cough [ ] shortness of breath [ ] dyspnea [ ] wheezing [ ] sputum [ ] hemoptysis  GI: [ ] negative [ ] nausea [ ] vomiting [ ] diarrhea [ ] constipation [ ] abd pain [ ] dysphagia   : [ ] negative [ ] dysuria [ ] nocturia [ ] hematuria [ ] increased urinary frequency  Musculoskeletal: [ ] negative [ ] back pain [ ] myalgias [ ] arthralgias [ ] fracture  Skin: [ ] negative [ ] rash [ ] itch  Neurological: [ ] negative [ ] headache [ ] dizziness [ ] syncope [ ] weakness [ ] numbness  Psychiatric: [ ] negative [ ] anxiety [ ] depression  Endocrine: [ ] negative [ ] diabetes [ ] thyroid problem  Hematologic/Lymphatic: [ ] negative [ ] anemia [ ] bleeding problem  Allergic/Immunologic: [ ] negative [ ] itchy eyes [ ] nasal discharge [ ] hives [ ] angioedema  [ ] All other systems negative  [ ] Unable to assess ROS because ________    OBJECTIVE:  ICU Vital Signs Last 24 Hrs  T(C): 37.1 (2018 13:40), Max: 37.3 (2018 07:11)  T(F): 98.7 (2018 13:40), Max: 99.2 (2018 07:11)  HR: 83 (2018 13:40) (70 - 105)  BP: 119/63 (2018 13:40) (96/54 - 151/56)  BP(mean): --  ABP: --  ABP(mean): --  RR: 18 (2018 13:40) (18 - 19)  SpO2: 96% (2018 13:40) (96% - 99%)        CAPILLARY BLOOD GLUCOSE          PHYSICAL EXAM:  General: NAD, lying in bed comfortably on RA   HEENT: NCAT, MOM, No LAD. Slightly erythematous posterior pharynx, no exudates noted.  Respiratory: CTA b/l, no respiratory distress. No wheezing/rales/rhonchi  Cardiovascular: RRR, No m/g/r, 9-) JVD  Abdomen: NT/ND  Extremities: (-) edema b/l  Skin: c/d/i. Mediport in place, c/d/i  Neurological: Nonfocal findings  Psychiatry: Appropriate mood/affect    HOSPITAL MEDICATIONS:  Standing Meds:  ALBUTerol/ipratropium for Nebulization 3 milliLiter(s) Nebulizer every 6 hours  benzonatate 100 milliGRAM(s) Oral daily  buDESOnide  80 MICROgram(s)/formoterol 4.5 MICROgram(s) Inhaler 2 Puff(s) Inhalation two times a day  enoxaparin Injectable 40 milliGRAM(s) SubCutaneous daily  montelukast 10 milliGRAM(s) Oral daily  predniSONE   Tablet 40 milliGRAM(s) Oral daily  sodium chloride 0.9%. 1000 milliLiter(s) IV Continuous <Continuous>  sodium chloride 3%  Inhalation 4 milliLiter(s) Inhalation daily      PRN Meds:  guaiFENesin    Syrup 100 milliGRAM(s) Oral every 6 hours PRN      LABS:                        10.7   2.21  )-----------( 188      ( 2018 07:00 )             32.8     Hgb Trend: 10.7<--, 10.6<--      137  |  103  |  6<L>  ----------------------------<  109<H>  3.8   |  24  |  0.50    Ca    8.1<L>      2018 07:00  Phos  2.7       Mg     2.0         TPro  6.8  /  Alb  3.4  /  TBili  0.5  /  DBili  x   /  AST  15  /  ALT  10  /  AlkPhos  67      Creatinine Trend: 0.50<--, 0.61<--  PT/INR - ( 2018 14:35 )   PT: 11.9 SEC;   INR: 1.07          PTT - ( 2018 14:35 )  PTT:25.7 SEC  Urinalysis Basic - ( 2018 03:00 )    Color: PLYEL / Appearance: CLEAR / S.023 / pH: 6.0  Gluc: NEGATIVE / Ketone: NEGATIVE  / Bili: NEGATIVE / Urobili: NORMAL mg/dL   Blood: NEGATIVE / Protein: NEGATIVE mg/dL / Nitrite: NEGATIVE   Leuk Esterase: NEGATIVE / RBC: 0-2 / WBC 2-5   Sq Epi: x / Non Sq Epi: x / Bacteria: x        Venous Blood Gas:   @ 14:35  7.41/49/31/29/53.4  VBG Lactate: 0.9    Lab work personally reviewed. CBC significant for leukopenia (WBC 2.21) with ANC 1.05. CMP unremarkable, pro-. UA (-).  RVP (-). Cx pending.    MICROBIOLOGY:       RADIOLOGY:  [X ] Reviewed and interpreted by me      EXAM: CT ANGIO CHEST (W)AW IC       PROCEDURE DATE: 2018         INTERPRETATION: CTA CHEST WITH CONTRAST (CT PULMONARY EMBOLUS)     INDICATION: Chest pain, fever, and cough.     TECHNIQUE: Unenhanced helical images were obtained of the chest. Coronal and   sagittal images were reconstructed. Images were obtained after the   uneventful administration of 90 cc of nonionic intravenous contrast   (Omnipaque 350). 10 cc of nonionic intravenous contrast (Omnipaque 350) was   discarded. Maximum intensity projection images were generated.     COMPARISON: CT 2017. Chest radiograph 2016.     FINDINGS:     Pulmonary Artery: The main pulmonary artery measures 2.8 cm. There is no   main, central, lobar, segmental, or subsegmental pulmonary embolus.     Tubes/Lines: Right chest Mediport tip in the SVC.     Lungs And Airways: A 1.5 x 1.6 cm opacity is in the right upper lobe with   adjacent pleural thickening and calcification and right upper lobe volume   loss   There is right middle lobe volume loss with patchy linear and nodular   opacities which abuts the pleural calcifications. Subsegmental atelectasis   of the right lower lobe.     Bilateral lower lobe subsegmental atelectasis. Status post left lower   lobectomy. Calcifications of the left upper lobe and linear atelectasis or   scarring.     Pleura: No pneumothorax. No pleural effusions. Bilateral pleural   calcifications. The pleura is thickened.     Mediastinum: Small calcified an noncalcified mediastinal lymph nodes. Right   lobe thyroid calcifications in hypodense nodules. The esophagus is   unremarkable.     Heart and Vasculature: The heart is normal in size. There is no pericardial   effusion. Pericardial calcifications. There are coronary artery   calcifications.     Left-sided aortic arch and left-sided descending thoracic aorta.   Atheromatous disease of the aorta. There are tiny vessels within the   mediastinum likely secondary to bronchial artery vessels which supply the   upper lobe and middle lobe. The main pulmonary artery is normal in caliber.   The right upper lobe artery is small in caliber. The right middle lobar   artery is likely severely narrowed. The right middle lobe pulmonary vein is   not well visualized and is likely severely narrowed as well.     Upper Abdomen: Moderate upper abdominal ascites.     Bones And Soft Tissues: Degenerative change of the spine.     IMPRESSION:     1. No pulmonary embolism.   2. The volume loss and opacities in the right upper lobe and right middle   lobe could be the sequelae of prior infection or inflammation given the   pleural calcifications and narrowing of the upper and lobar pulmonary   arteries.   3. Bilateral pleural calcifications and pleural thickening suggestive of   prior infection or inflammation.   4. Small calcified mediastinal lymph nodes and calcified pericardium may be   the sequelae of prior infection.   5. New moderate upper abdominal ascites.     PULMONARY FUNCTION TESTS: See HPI    EKG:

## 2018-01-23 NOTE — H&P ADULT - NSHPPHYSICALEXAM_GEN_ALL_CORE
Vital Signs Last 24 Hrs  T(C): 36.8 (23 Jan 2018 02:41), Max: 38 (22 Jan 2018 15:30)  T(F): 98.3 (23 Jan 2018 02:41), Max: 100.4 (22 Jan 2018 15:30)  HR: 77 (23 Jan 2018 02:41) (72 - 105)  BP: 144/68 (23 Jan 2018 02:41) (96/54 - 144/68)  BP(mean): --  RR: 18 (23 Jan 2018 02:41) (18 - 19)  SpO2: 99% (23 Jan 2018 02:41) (96% - 100%)    PHYSICAL EXAM:  GENERAL: NAD, well-groomed, well-developed  HEAD:  Atraumatic, Normocephalic  EYES: EOMI, PERRLA, conjunctiva and sclera clear  ENMT: No tonsillar erythema, exudates, or enlargement; Moist mucous membranes  NECK: Supple, No JVD  NERVOUS SYSTEM: AOX3, motor and sensation grossly intact in b/l UE and b/l LE  PSYCHIATRIC: Appropriate affect and mood  CHEST/LUNG: +wheezing bilateral upper lobes, No rales or ronchi  HEART: Regular rate and rhythm; No murmurs, rubs, or gallops. No LE edema  ABDOMEN: Soft, Nontender, +mildly distended. Bowel sounds present  EXTREMITIES:  2+ Peripheral Pulses, No clubbing, cyanosis  SKIN: No rashes or lesions. + R chest wall port,nontender, no erythema

## 2018-01-24 DIAGNOSIS — J45.50 SEVERE PERSISTENT ASTHMA, UNCOMPLICATED: ICD-10-CM

## 2018-01-24 DIAGNOSIS — R05 COUGH: ICD-10-CM

## 2018-01-24 DIAGNOSIS — J45.20 MILD INTERMITTENT ASTHMA, UNCOMPLICATED: ICD-10-CM

## 2018-01-24 LAB
BACTERIA UR CULT: SIGNIFICANT CHANGE UP
BUN SERPL-MCNC: 8 MG/DL — SIGNIFICANT CHANGE UP (ref 7–23)
CALCIUM SERPL-MCNC: 8.6 MG/DL — SIGNIFICANT CHANGE UP (ref 8.4–10.5)
CHLORIDE SERPL-SCNC: 106 MMOL/L — SIGNIFICANT CHANGE UP (ref 98–107)
CO2 SERPL-SCNC: 25 MMOL/L — SIGNIFICANT CHANGE UP (ref 22–31)
CREAT SERPL-MCNC: 0.44 MG/DL — LOW (ref 0.5–1.3)
CULTURE - ACID FAST SMEAR CONCENTRATED: SIGNIFICANT CHANGE UP
GLUCOSE SERPL-MCNC: 99 MG/DL — SIGNIFICANT CHANGE UP (ref 70–99)
HCT VFR BLD CALC: 28.7 % — LOW (ref 34.5–45)
HGB BLD-MCNC: 9.3 G/DL — LOW (ref 11.5–15.5)
MCHC RBC-ENTMCNC: 31 PG — SIGNIFICANT CHANGE UP (ref 27–34)
MCHC RBC-ENTMCNC: 32.4 % — SIGNIFICANT CHANGE UP (ref 32–36)
MCV RBC AUTO: 95.7 FL — SIGNIFICANT CHANGE UP (ref 80–100)
NRBC # FLD: 0 — SIGNIFICANT CHANGE UP
PLATELET # BLD AUTO: 183 K/UL — SIGNIFICANT CHANGE UP (ref 150–400)
PMV BLD: 10.2 FL — SIGNIFICANT CHANGE UP (ref 7–13)
POTASSIUM SERPL-MCNC: 3.5 MMOL/L — SIGNIFICANT CHANGE UP (ref 3.5–5.3)
POTASSIUM SERPL-SCNC: 3.5 MMOL/L — SIGNIFICANT CHANGE UP (ref 3.5–5.3)
RBC # BLD: 3 M/UL — LOW (ref 3.8–5.2)
RBC # FLD: 14.9 % — HIGH (ref 10.3–14.5)
SODIUM SERPL-SCNC: 142 MMOL/L — SIGNIFICANT CHANGE UP (ref 135–145)
SPECIMEN SOURCE: SIGNIFICANT CHANGE UP
SPECIMEN SOURCE: SIGNIFICANT CHANGE UP
WBC # BLD: 2.55 K/UL — LOW (ref 3.8–10.5)
WBC # FLD AUTO: 2.55 K/UL — LOW (ref 3.8–10.5)

## 2018-01-24 PROCEDURE — 99232 SBSQ HOSP IP/OBS MODERATE 35: CPT

## 2018-01-24 PROCEDURE — 99233 SBSQ HOSP IP/OBS HIGH 50: CPT

## 2018-01-24 RX ADMIN — Medication 3 MILLILITER(S): at 16:47

## 2018-01-24 RX ADMIN — ENOXAPARIN SODIUM 40 MILLIGRAM(S): 100 INJECTION SUBCUTANEOUS at 13:33

## 2018-01-24 RX ADMIN — BUDESONIDE AND FORMOTEROL FUMARATE DIHYDRATE 2 PUFF(S): 160; 4.5 AEROSOL RESPIRATORY (INHALATION) at 23:15

## 2018-01-24 RX ADMIN — BUDESONIDE AND FORMOTEROL FUMARATE DIHYDRATE 2 PUFF(S): 160; 4.5 AEROSOL RESPIRATORY (INHALATION) at 10:10

## 2018-01-24 RX ADMIN — Medication 100 MILLIGRAM(S): at 13:31

## 2018-01-24 RX ADMIN — Medication 100 MILLIGRAM(S): at 06:10

## 2018-01-24 RX ADMIN — BUDESONIDE AND FORMOTEROL FUMARATE DIHYDRATE 2 PUFF(S): 160; 4.5 AEROSOL RESPIRATORY (INHALATION) at 00:48

## 2018-01-24 RX ADMIN — Medication 3 MILLILITER(S): at 04:28

## 2018-01-24 RX ADMIN — SODIUM CHLORIDE 75 MILLILITER(S): 9 INJECTION INTRAMUSCULAR; INTRAVENOUS; SUBCUTANEOUS at 06:13

## 2018-01-24 RX ADMIN — PIPERACILLIN AND TAZOBACTAM 25 GRAM(S): 4; .5 INJECTION, POWDER, LYOPHILIZED, FOR SOLUTION INTRAVENOUS at 13:35

## 2018-01-24 RX ADMIN — Medication 100 MILLIGRAM(S): at 13:30

## 2018-01-24 RX ADMIN — Medication 3 MILLILITER(S): at 21:38

## 2018-01-24 RX ADMIN — MONTELUKAST 10 MILLIGRAM(S): 4 TABLET, CHEWABLE ORAL at 13:32

## 2018-01-24 RX ADMIN — PIPERACILLIN AND TAZOBACTAM 25 GRAM(S): 4; .5 INJECTION, POWDER, LYOPHILIZED, FOR SOLUTION INTRAVENOUS at 23:15

## 2018-01-24 NOTE — PROGRESS NOTE ADULT - PROBLEM SELECTOR PROBLEM 5
Severe persistent asthma, unspecified whether complicated Mild intermittent asthma, unspecified whether complicated

## 2018-01-25 ENCOUNTER — TRANSCRIPTION ENCOUNTER (OUTPATIENT)
Age: 78
End: 2018-01-25

## 2018-01-25 VITALS
RESPIRATION RATE: 18 BRPM | OXYGEN SATURATION: 100 % | TEMPERATURE: 98 F | DIASTOLIC BLOOD PRESSURE: 87 MMHG | HEART RATE: 74 BPM | SYSTOLIC BLOOD PRESSURE: 156 MMHG

## 2018-01-25 LAB
BUN SERPL-MCNC: 7 MG/DL — SIGNIFICANT CHANGE UP (ref 7–23)
CALCIUM SERPL-MCNC: 8.3 MG/DL — LOW (ref 8.4–10.5)
CHLORIDE SERPL-SCNC: 104 MMOL/L — SIGNIFICANT CHANGE UP (ref 98–107)
CO2 SERPL-SCNC: 29 MMOL/L — SIGNIFICANT CHANGE UP (ref 22–31)
CREAT SERPL-MCNC: 0.51 MG/DL — SIGNIFICANT CHANGE UP (ref 0.5–1.3)
CULTURE - ACID FAST SMEAR CONCENTRATED: SIGNIFICANT CHANGE UP
GLUCOSE SERPL-MCNC: 97 MG/DL — SIGNIFICANT CHANGE UP (ref 70–99)
HCT VFR BLD CALC: 31.4 % — LOW (ref 34.5–45)
HGB BLD-MCNC: 10 G/DL — LOW (ref 11.5–15.5)
M TB TUBERC IFN-G BLD QL: 0.03 IU/ML — SIGNIFICANT CHANGE UP
M TB TUBERC IFN-G BLD QL: 0.16 IU/ML — SIGNIFICANT CHANGE UP
M TB TUBERC IFN-G BLD QL: NEGATIVE — SIGNIFICANT CHANGE UP
MCHC RBC-ENTMCNC: 30.4 PG — SIGNIFICANT CHANGE UP (ref 27–34)
MCHC RBC-ENTMCNC: 31.8 % — LOW (ref 32–36)
MCV RBC AUTO: 95.4 FL — SIGNIFICANT CHANGE UP (ref 80–100)
MITOGEN IGNF BCKGRD COR BLD-ACNC: >10 IU/ML — SIGNIFICANT CHANGE UP
NRBC # FLD: 0 — SIGNIFICANT CHANGE UP
PLATELET # BLD AUTO: 220 K/UL — SIGNIFICANT CHANGE UP (ref 150–400)
PMV BLD: 9.8 FL — SIGNIFICANT CHANGE UP (ref 7–13)
POTASSIUM SERPL-MCNC: 4 MMOL/L — SIGNIFICANT CHANGE UP (ref 3.5–5.3)
POTASSIUM SERPL-SCNC: 4 MMOL/L — SIGNIFICANT CHANGE UP (ref 3.5–5.3)
RBC # BLD: 3.29 M/UL — LOW (ref 3.8–5.2)
RBC # FLD: 15.3 % — HIGH (ref 10.3–14.5)
SODIUM SERPL-SCNC: 141 MMOL/L — SIGNIFICANT CHANGE UP (ref 135–145)
SPECIMEN SOURCE: SIGNIFICANT CHANGE UP
WBC # BLD: 2.25 K/UL — LOW (ref 3.8–10.5)
WBC # FLD AUTO: 2.25 K/UL — LOW (ref 3.8–10.5)

## 2018-01-25 PROCEDURE — 99233 SBSQ HOSP IP/OBS HIGH 50: CPT

## 2018-01-25 PROCEDURE — 99232 SBSQ HOSP IP/OBS MODERATE 35: CPT

## 2018-01-25 RX ORDER — CIPROFLOXACIN LACTATE 400MG/40ML
1 VIAL (ML) INTRAVENOUS
Qty: 4 | Refills: 0 | OUTPATIENT
Start: 2018-01-25 | End: 2018-01-28

## 2018-01-25 RX ORDER — DIPHENHYDRAMINE HYDROCHLORIDE AND LIDOCAINE HYDROCHLORIDE AND ALUMINUM HYDROXIDE AND MAGNESIUM HYDRO
5 KIT
Qty: 0 | Refills: 0 | Status: DISCONTINUED | OUTPATIENT
Start: 2018-01-25 | End: 2018-01-25

## 2018-01-25 RX ORDER — ALBUTEROL 90 UG/1
1 AEROSOL, METERED ORAL EVERY 6 HOURS
Qty: 0 | Refills: 0 | Status: DISCONTINUED | OUTPATIENT
Start: 2018-01-25 | End: 2018-01-25

## 2018-01-25 RX ADMIN — PIPERACILLIN AND TAZOBACTAM 25 GRAM(S): 4; .5 INJECTION, POWDER, LYOPHILIZED, FOR SOLUTION INTRAVENOUS at 05:08

## 2018-01-25 RX ADMIN — MONTELUKAST 10 MILLIGRAM(S): 4 TABLET, CHEWABLE ORAL at 13:54

## 2018-01-25 RX ADMIN — ENOXAPARIN SODIUM 40 MILLIGRAM(S): 100 INJECTION SUBCUTANEOUS at 13:54

## 2018-01-25 RX ADMIN — BUDESONIDE AND FORMOTEROL FUMARATE DIHYDRATE 2 PUFF(S): 160; 4.5 AEROSOL RESPIRATORY (INHALATION) at 09:17

## 2018-01-25 RX ADMIN — PIPERACILLIN AND TAZOBACTAM 25 GRAM(S): 4; .5 INJECTION, POWDER, LYOPHILIZED, FOR SOLUTION INTRAVENOUS at 13:53

## 2018-01-25 RX ADMIN — Medication 100 MILLIGRAM(S): at 13:53

## 2018-01-25 RX ADMIN — SODIUM CHLORIDE 4 MILLILITER(S): 9 INJECTION INTRAMUSCULAR; INTRAVENOUS; SUBCUTANEOUS at 09:22

## 2018-01-25 NOTE — DISCHARGE NOTE ADULT - PROVIDER TOKENS
FREE:[LAST:[Dr. Paul Rahman],PHONE:[phone]],FAX:[(   )    -]] FREE:[LAST:[Dr. Paul Rahman],PHONE:[phone]],FAX:[(   )    -]],FREE:[LAST:[your oncologist at [facility]],PHONE:[(   )    -],FAX:[(   )    -]]

## 2018-01-25 NOTE — PROGRESS NOTE ADULT - PROBLEM SELECTOR PLAN 3
- history of chronic hepatitis B  - was on entecavir - unclear if finished course - f/u with PCP in am
- s/p surgery, currently undergoing chemotherapy  - records in chart from [facility]
- history of chronic hepatitis B  - was on entecavir - unclear if finished course - f/u with PCP in am

## 2018-01-25 NOTE — DISCHARGE NOTE ADULT - CARE PROVIDER_API CALL
Dr. Paul Rahman,   Phone: [phone]  Fax: (       - Dr. Paul Rahman,   Phone: [phone]  Fax: (   )    -    your oncologist at [facility],   Phone: (   )    -  Fax: (   )    -

## 2018-01-25 NOTE — PROGRESS NOTE ADULT - SUBJECTIVE AND OBJECTIVE BOX
Patient is a 77y old  Female who presents with a chief complaint of fever, cough (2018 03:51)      SUBJECTIVE / OVERNIGHT EVENTS: [id number]- pt said she felt better after abx- less cough- has been coughing w/ phlegm and associated urinary incontinence for a few weeks now.      MEDICATIONS  (STANDING):  ALBUTerol/ipratropium for Nebulization 3 milliLiter(s) Nebulizer every 6 hours  benzonatate 100 milliGRAM(s) Oral daily  buDESOnide  80 MICROgram(s)/formoterol 4.5 MICROgram(s) Inhaler 2 Puff(s) Inhalation two times a day  enoxaparin Injectable 40 milliGRAM(s) SubCutaneous daily  montelukast 10 milliGRAM(s) Oral daily  predniSONE   Tablet 40 milliGRAM(s) Oral daily  sodium chloride 0.9%. 1000 milliLiter(s) (75 mL/Hr) IV Continuous <Continuous>    MEDICATIONS  (PRN):  guaiFENesin    Syrup 100 milliGRAM(s) Oral every 6 hours PRN Cough      Meds ordered within last 24hours  acetaminophen  IVPB: [Known as OFIRMEV for TEMP]  1000 milliGRAM(s) in IV Solution 100 milliLiter(s), IV Intermittent, once, infuse over 15 Minute(s), Stop After 1 Doses  Special Instructions: Acetaminophen dosage should not exceed 4,000 milliGRAM(s) in 24 hours.  Administration Instructions: MAX DAILY DOSE:  ADULT = 4,000 mG/Day ( @ 15:48)  piperacillin/tazobactam IVPB.: [Known as ZOSYN IVPB.]  3.375 Gram(s) in dextrose 5% 100 milliLiter(s), IV Intermittent, once, infuse over 30 Minute(s), Stop After 1 Doses  Indication: infectious, PNA? chemo, febrile.  Special Instructions: LOADING DOSE ( @ 15:48)  vancomycin  IVPB:   1000 milliGRAM(s) in dextrose 5% 250 milliLiter(s), IV Intermittent, once, infuse over 60 Minute(s), Stop After 1 Doses  Indication: infectious, PNA? chemo, febrile.  Provider's Contact #: [phone] ( @ 15:48)  HYDROcodone/homatropine Syrup: [Ordered as HYCODAN]  5 milliLiter(s), Oral, Once, Stop After 1 Doses  Administration Instructions: This is a Look-alike/Sound-alike Medication  Provider's Contact #: [phone] ( @ 15:48)  (Floorstock):   Qty Removed: 1 each ( @ 18:28)  (Floorstock):   Qty Removed: 1 each ( @ 18:28)  (Floorstock):   Qty Removed: 1 each ( @ 18:28)  (Floorstock):   Qty Removed: 1 each ( @ 18:29)  (Floorstock):   Qty Removed: 1 each ( @ 18:29)  (Floorstock):   Qty Removed: 1 each ( @ 19:00)  sodium chloride 0.9% Bolus:   1000 milliLiter(s), IV Bolus, once, infuse over 1 Hour(s), Stop After 1 Doses  Provider's Contact #: [phone] ( @ 00:05)  sodium chloride 0.9%.: Solution, 1000 milliLiter(s) infuse at 75 mL/Hr  Provider's Contact #: [phone] ( @ 00:49)  sodium chloride 0.9% Bolus:   500 milliLiter(s), IV Bolus, once, infuse over 1 Hour(s), Stop After 1 Doses  Provider's Contact #: [phone] ( @ 00:50)  ALBUTerol/ipratropium for Nebulization: Known as DUONEB  3 milliLiter(s), Nebulizer, every 6 hours  Special Instructions: Continue small volume nebulizer treatment until respiratory assessment and patient education determines treatment can be converted to MDI per Pharmacy and Therapeutics protocol. Discontinue nebulizer order once patient has converted to MDI.  Below is the criteria that must be met to make the conversion:  1.Patient is cooperative, alert, oriented & follows instructions  2.Patient has sufficient pre-bronchodilator inspiratory flow to be able to take a slow, deep inspiration and hold their breath f  Administration Instructions: Contains: 2.5 mG albuterol and 0.5 mG ipratropium  This is a Look-alike/Sound-alike Medication  Provider's Contact #: [phone] ( @ 04:12)  enoxaparin Injectable: [Ordered as LOVENOX]  40 milliGRAM(s), SubCutaneous, daily  Provider's Contact #: [phone] ( @ 04:12)  buDESOnide  80 MICROgram(s)/formoterol 4.5 MICROgram(s) Inhaler: Known as SYMBICORT  80 MICROgram(s)  2 Puff(s), Inhalation, two times a day  Administration Instructions: shake well  Return unused medication to Pharmacy.  This is a Look-alike/Sound-alike Medication ( @ 04:20)  montelukast: [Known as SINGULAIR]  10 milliGRAM(s), Oral, daily  Provider's Contact #: [phone] ( @ 04:20)  vancomycin  IVPB:   1000 milliGRAM(s) in dextrose 5% 250 milliLiter(s), IV Intermittent, every 12 hours, infuse over 60 Minute(s)  Indication: SIRS, immunocompromised  Provider's Contact #: [phone] ( @ 04:26)  cefepime  IVPB: [Known as MAXIPIME]  2000 milliGRAM(s) in dextrose 5% 50 milliLiter(s), IV Intermittent, every 8 hours, infuse over 30 Minute(s)  Indication: sirs, immunocompromised  Provider's Contact #: [phone] ( @ 04:26)  benzonatate: [Ordered as TESSALON PERLE]  100 milliGRAM(s), Oral, daily  Administration Instructions: swallow whole * don't crush/chew  Provider's Contact #: [phone] ( @ 04:31)  guaiFENesin    Syrup: [Ordered as ROBITUSSIN]  100 milliGRAM(s), Oral, every 6 hours, PRN for Cough  Administration Instructions: This is a Look-alike/Sound-alike Medication  Provider's Contact #: [phone] ( @ 04:31)  predniSONE   Tablet: [Known as DELTASONE]  40 milliGRAM(s), Oral, daily, Stop After 5 Days  Administration Instructions: This is a Look-alike/Sound-alike Medication  Provider's Contact #: [phone] ( @ 04:54)  simethicone: [Known as MYLICON]  80 milliGRAM(s), Chew, once, Stop After 1 Doses  Provider's Contact #: [phone] ( @ 06:04)  (Floorstock):   Qty Removed: 2 each ( @ 06:21)  (Floorstock):   Qty Removed: 1 each ( @ 06:23)  (Floorstock):   Qty Removed: 1 each ( @ 12:41)      T(C): 37 (18 @ 10:48), Max: 38 (18 @ 15:30)  HR: 70 (18 @ 12:26) (70 - 105)  BP: 151/56 (18 @ 10:48) (96/54 - 151/56)  RR: 18 (18 @ 07:11) (18 - 19)  SpO2: 98% (18 @ 07:11) (96% - 100%)    CAPILLARY BLOOD GLUCOSE        I&O's Summary      PHYSICAL EXAM:  GENERAL: NAD  CHEST/LUNG: Coarse bs   HEART: Regular rate and rhythm; No murmurs, rubs, or gallops  ABDOMEN: Soft, Nontender, Nondistended; Bowel sounds present  EXTREMITIES:  No clubbing, cyanosis, or edema      LABS:                        10.7   2.21  )-----------( 188      ( 2018 07:00 )             32.8         137  |  103  |  6<L>  ----------------------------<  109<H>  3.8   |  24  |  0.50    Ca    8.1<L>      2018 07:00  Phos  2.7       Mg     2.0         TPro  6.8  /  Alb  3.4  /  TBili  0.5  /  DBili  x   /  AST  15  /  ALT  10  /  AlkPhos  67      PT/INR - ( 2018 14:35 )   PT: 11.9 SEC;   INR: 1.07          PTT - ( 2018 14:35 )  PTT:25.7 SEC  CARDIAC MARKERS ( 2018 14:35 )  x     / < 0.06 ng/mL / 70 u/L / x     / x          Urinalysis Basic - ( 2018 03:00 )    Color: PLYEL / Appearance: CLEAR / S.023 / pH: 6.0  Gluc: NEGATIVE / Ketone: NEGATIVE  / Bili: NEGATIVE / Urobili: NORMAL mg/dL   Blood: NEGATIVE / Protein: NEGATIVE mg/dL / Nitrite: NEGATIVE   Leuk Esterase: NEGATIVE / RBC: 0-2 / WBC 2-5   Sq Epi: x / Non Sq Epi: x / Bacteria: x        RADIOLOGY & ADDITIONAL TESTS:    Imaging Personally Reviewed:    Consultant(s) Notes Reviewed:      Care Discussed with Consultants/Other Providers:
Follow Up:      Inverval History/ROS:Patient is a 77y old  Female who presents with a chief complaint of fever, cough (2018 03:51)    No fever overnight.  Allergies    Allergy Status Unknown  No Known Allergies    Intolerances        ANTIMICROBIALS:  piperacillin/tazobactam IVPB. 3.375 every 8 hours      OTHER MEDS:  ALBUTerol/ipratropium for Nebulization 3 milliLiter(s) Nebulizer every 6 hours  benzonatate 100 milliGRAM(s) Oral daily  buDESOnide  80 MICROgram(s)/formoterol 4.5 MICROgram(s) Inhaler 2 Puff(s) Inhalation two times a day  enoxaparin Injectable 40 milliGRAM(s) SubCutaneous daily  guaiFENesin    Syrup 100 milliGRAM(s) Oral every 6 hours PRN  montelukast 10 milliGRAM(s) Oral daily  sodium chloride 0.9%. 1000 milliLiter(s) IV Continuous <Continuous>  sodium chloride 3%  Inhalation 4 milliLiter(s) Inhalation daily      Vital Signs Last 24 Hrs  T(C): 36.6 (2018 05:58), Max: 37.1 (2018 13:40)  T(F): 97.9 (2018 05:58), Max: 98.7 (2018 13:40)  HR: 63 (2018 05:58) (63 - 83)  BP: 115/68 (2018 05:58) (115/68 - 135/78)  BP(mean): --  RR: 18 (2018 05:58) (18 - 18)  SpO2: 99% (2018 05:58) (96% - 99%)    PHYSICAL EXAM:  General: [x ] non-toxic  HEAD/EYES: [ ] PERRL [x ] white sclera [ ] icterus  ENT:  [ ] normal [x ] supple [ ] thrush [ ] pharyngeal exudate  Cardiovascular:   [ ] murmur [ x] normal [ ] PPM/AICD  Respiratory:  [x ] clear to ausculation bilaterally  GI:  [x ] soft, non-tender, normal bowel sounds  :  [ ] nieves [ ] no CVA tenderness   Musculoskeletal:  [ ] no synovitis  Neurologic:  [ ] non-focal exam   Skin:  [x ] no rash  Lymph: [ ] no lymphadenopathy  Psychiatric:  [x ] appropriate affect [ ] alert & oriented  Lines:  [x ] no phlebitis [ ] central line                                9.3    2.55  )-----------( 183      ( 2018 07:45 )             28.7           142  |  106  |  8   ----------------------------<  99  3.5   |  25  |  0.44<L>    Ca    8.6      2018 07:45  Phos  2.7       Mg     2.0         TPro  6.8  /  Alb  3.4  /  TBili  0.5  /  DBili  x   /  AST  15  /  ALT  10  /  AlkPhos  67        Urinalysis Basic - ( 2018 03:00 )    Color: PLYEL / Appearance: CLEAR / S.023 / pH: 6.0  Gluc: NEGATIVE / Ketone: NEGATIVE  / Bili: NEGATIVE / Urobili: NORMAL mg/dL   Blood: NEGATIVE / Protein: NEGATIVE mg/dL / Nitrite: NEGATIVE   Leuk Esterase: NEGATIVE / RBC: 0-2 / WBC 2-5   Sq Epi: x / Non Sq Epi: x / Bacteria: x        MICROBIOLOGY:    RADIOLOGY:
Follow Up:      Inverval History/ROS:Patient is a 77y old  Female who presents with a chief complaint of fever, cough (23 Jan 2018 03:51)    Afebrile.   Still has cough.       Allergies    Allergy Status Unknown  No Known Allergies    Intolerances        ANTIMICROBIALS:  piperacillin/tazobactam IVPB. 3.375 every 8 hours      OTHER MEDS:  ALBUTerol    90 MICROgram(s) HFA Inhaler 1 Puff(s) Inhalation every 6 hours  benzonatate 100 milliGRAM(s) Oral daily  buDESOnide  80 MICROgram(s)/formoterol 4.5 MICROgram(s) Inhaler 2 Puff(s) Inhalation two times a day  enoxaparin Injectable 40 milliGRAM(s) SubCutaneous daily  guaiFENesin    Syrup 100 milliGRAM(s) Oral every 6 hours PRN  MIRACLE MOUTHWASH 5 milliLiter(s) Swish and Swallow four times a day PRN  montelukast 10 milliGRAM(s) Oral daily  sodium chloride 3%  Inhalation 4 milliLiter(s) Inhalation daily      Vital Signs Last 24 Hrs  T(C): 36.8 (25 Jan 2018 05:07), Max: 37.2 (24 Jan 2018 17:54)  T(F): 98.3 (25 Jan 2018 05:07), Max: 98.9 (24 Jan 2018 17:54)  HR: 75 (25 Jan 2018 09:22) (75 - 84)  BP: 154/86 (25 Jan 2018 05:07) (130/69 - 154/86)  BP(mean): --  RR: 17 (25 Jan 2018 05:07) (17 - 18)  SpO2: 97% (25 Jan 2018 09:22) (96% - 99%)    PHYSICAL EXAM:  General: [x ] non-toxic  HEAD/EYES: [ ] PERRL [ x] white sclera [ ] icterus  ENT:  [ ] normal [ x] supple [ ] thrush [ ] pharyngeal exudate  Cardiovascular:   [ ] murmur [x ] normal [ ] PPM/AICD  Respiratory:  [ x] clear to ausculation bilaterally  GI:  [x ] soft, non-tender, normal bowel sounds  :  [ ] nieves [ ] no CVA tenderness   Musculoskeletal:  [ ] no synovitis  Neurologic:  [ ] non-focal exam   Skin:  [ ] no rash  Lymph:x [x ] no lymphadenopathy  Psychiatric:  [ ] appropriate affect [x ] alert & oriented  Lines:  [ x] no phlebitis [ ] central line                                10.0   2.25  )-----------( 220      ( 25 Jan 2018 09:00 )             31.4       01-25    141  |  104  |  7   ----------------------------<  97  4.0   |  29  |  0.51    Ca    8.3<L>      25 Jan 2018 07:25            MICROBIOLOGY:    RADIOLOGY:
Patient is a 77y old  Female who presents with a chief complaint of fever, cough (2018 03:51)      SUBJECTIVE / OVERNIGHT EVENTS: pt seen at 11am  ID [id number]- pt upset about antibiotics had to explain to her repeatedly the reason why she should take it.  also explained again about why we are testing for TB due to immunocompromised state    MEDICATIONS  (STANDING):  ALBUTerol/ipratropium for Nebulization 3 milliLiter(s) Nebulizer every 6 hours  benzonatate 100 milliGRAM(s) Oral daily  buDESOnide  80 MICROgram(s)/formoterol 4.5 MICROgram(s) Inhaler 2 Puff(s) Inhalation two times a day  enoxaparin Injectable 40 milliGRAM(s) SubCutaneous daily  montelukast 10 milliGRAM(s) Oral daily  piperacillin/tazobactam IVPB. 3.375 Gram(s) IV Intermittent every 8 hours  sodium chloride 3%  Inhalation 4 milliLiter(s) Inhalation daily    MEDICATIONS  (PRN):  guaiFENesin    Syrup 100 milliGRAM(s) Oral every 6 hours PRN Cough      Meds ordered within last 24hours  (Floorstock):   Qty Removed: 1 each ( @ 18:42)  acetaminophen   Tablet.: [Known as TYLENOL.]  650 milliGRAM(s), Oral, once, Stop After 1 Doses  Administration Instructions: MAX DAILY DOSE:  ADULT = 4,000 mG/Day ( @ 21:26)  (Floorstock):   Qty Removed: 1 each ( @ 21:37)  (Floorstock):   Qty Removed: 2 each ( @ 21:40)      T(C): 37.4 (18 @ 14:51), Max: 37.4 (18 @ 14:51)  HR: 77 (18 @ 16:48) (63 - 79)  BP: 129/71 (18 @ 14:51) (115/68 - 135/78)  RR: 18 (18 @ 14:51) (18 - 18)  SpO2: 97% (18 @ 16:48) (97% - 99%)    CAPILLARY BLOOD GLUCOSE        I&O's Summary    2018 07:  -  2018 17:28  --------------------------------------------------------  IN: 120 mL / OUT: 0 mL / NET: 120 mL        PHYSICAL EXAM:  GENERAL: NAD  CHEST/LUNG: Clear to auscultation bilaterally; No wheeze  HEART: Regular rate and rhythm; No murmurs, rubs, or gallops  ABDOMEN: Soft, Nontender, Nondistended; Bowel sounds present  EXTREMITIES:  No clubbing, cyanosis, or edema      LABS:                        9.3    2.55  )-----------( 183      ( 2018 07:45 )             28.7         142  |  106  |  8   ----------------------------<  99  3.5   |  25  |  0.44<L>    Ca    8.6      2018 07:45  Phos  2.7       Mg     2.0                 Urinalysis Basic - ( 2018 03:00 )    Color: PLYEL / Appearance: CLEAR / S.023 / pH: 6.0  Gluc: NEGATIVE / Ketone: NEGATIVE  / Bili: NEGATIVE / Urobili: NORMAL mg/dL   Blood: NEGATIVE / Protein: NEGATIVE mg/dL / Nitrite: NEGATIVE   Leuk Esterase: NEGATIVE / RBC: 0-2 / WBC 2-5   Sq Epi: x / Non Sq Epi: x / Bacteria: x        RADIOLOGY & ADDITIONAL TESTS:    Imaging Personally Reviewed:    Consultant(s) Notes Reviewed:      Care Discussed with Consultants/Other Providers:
Patient is a 77y old  Female who presents with a chief complaint of fever, cough (23 Jan 2018 03:51)      SUBJECTIVE / OVERNIGHT EVENTS:  [id number]- pt c/o throat pain on swallowing; also w/ chronic cough bc her throat is dry  doesn't want GERD rx, miracle mouthwash or flonase (treatments for chronic cough)- she'd rather consult her oncologist.      MEDICATIONS  (STANDING):  ALBUTerol    90 MICROgram(s) HFA Inhaler 1 Puff(s) Inhalation every 6 hours  benzonatate 100 milliGRAM(s) Oral daily  buDESOnide  80 MICROgram(s)/formoterol 4.5 MICROgram(s) Inhaler 2 Puff(s) Inhalation two times a day  enoxaparin Injectable 40 milliGRAM(s) SubCutaneous daily  montelukast 10 milliGRAM(s) Oral daily  piperacillin/tazobactam IVPB. 3.375 Gram(s) IV Intermittent every 8 hours  sodium chloride 3%  Inhalation 4 milliLiter(s) Inhalation daily    MEDICATIONS  (PRN):  guaiFENesin    Syrup 100 milliGRAM(s) Oral every 6 hours PRN Cough  MIRACLE MOUTHWASH 5 milliLiter(s) Swish and Swallow four times a day PRN throat pain      Meds ordered within last 24hours  ALBUTerol    90 MICROgram(s) HFA Inhaler: Known as PROVENTIL HFA  1 Puff(s), Inhalation via Spacer, every 6 hours  Special Instructions: CONDITIONAL ORDER: ACTIVATE MDI order once respiratory assessment determines that patient education and treatment can be initiated and converted from small volume nebulizer to MDI per Pharmacy and Therapeutics protocol. Discontinue nebulizer order once patient has converted to MDI.  Below is the criteria that must be met to make the conversion:  1.Patient is cooperative, alert, oriented & follows instructions  2.Patient has sufficient pre-bronchodilator inspiratory flow to be able to take a slow  Provider's Contact #: [phone] (01-25 @ 09:02)  MIRACLE MOUTHWASH:   5 milliLiter(s), Swish and Swallow, four times a day, PRN for throat pain  Administration Instructions: shake well  Provider's Contact #: [phone] (01-25 @ 12:59)      T(C): 36.8 (01-25-18 @ 05:07), Max: 37.4 (01-24-18 @ 14:51)  HR: 75 (01-25-18 @ 09:22) (75 - 84)  BP: 154/86 (01-25-18 @ 05:07) (129/71 - 154/86)  RR: 17 (01-25-18 @ 05:07) (17 - 18)  SpO2: 97% (01-25-18 @ 09:22) (96% - 99%)    CAPILLARY BLOOD GLUCOSE        I&O's Summary    24 Jan 2018 07:01  -  25 Jan 2018 07:00  --------------------------------------------------------  IN: 120 mL / OUT: 0 mL / NET: 120 mL    25 Jan 2018 07:01  -  25 Jan 2018 13:09  --------------------------------------------------------  IN: 360 mL / OUT: 0 mL / NET: 360 mL        PHYSICAL EXAM:  GENERAL: NAD occ dry cough  CHEST/LUNG: Clear to auscultation bilaterally; No wheeze  HEART: Regular rate and rhythm; No murmurs, rubs, or gallops  ABDOMEN: Soft, Nontender, Nondistended; Bowel sounds present  EXTREMITIES:  No clubbing, cyanosis, or edema      LABS:                        10.0   2.25  )-----------( 220      ( 25 Jan 2018 09:00 )             31.4     01-25    141  |  104  |  7   ----------------------------<  97  4.0   |  29  |  0.51    Ca    8.3<L>      25 Jan 2018 07:25                RADIOLOGY & ADDITIONAL TESTS:    Imaging Personally Reviewed:    Consultant(s) Notes Reviewed:      Care Discussed with Consultants/Other Providers:

## 2018-01-25 NOTE — DISCHARGE NOTE ADULT - CARE PLAN
Principal Discharge DX:	Cough  Goal:	rule out infectious cause  Assessment and plan of treatment:	Levaquin was prescribed -please take till 1/29/2018. Follow up with your oncologist to ensure lung mass is stable in size.

## 2018-01-25 NOTE — PROGRESS NOTE ADULT - PROBLEM SELECTOR PLAN 5
- s/p L mastectomy in 2007, declined tamoxifen
no need for steroids  albuterol PRN
no need for steroids  albuterol PRN

## 2018-01-25 NOTE — DISCHARGE NOTE ADULT - PLAN OF CARE
rule out infectious cause Levaquin was prescribed -please take till 1/29/2018. Follow up with your oncologist to ensure lung mass is stable in size.

## 2018-01-25 NOTE — DISCHARGE NOTE ADULT - PATIENT PORTAL LINK FT
“You can access the Follow[employer] Patient Portal, offered by [facility], by registering with the following website: [email or web address]”

## 2018-01-25 NOTE — DISCHARGE NOTE ADULT - MEDICATION SUMMARY - MEDICATIONS TO TAKE
I will START or STAY ON the medications listed below when I get home from the hospital:    acetaminophen-oxyCODONE 325 mg-5 mg oral tablet  -- 1 tab(s) by mouth every 4 hours, As needed, Moderate Pain  -- Indication: For Pain    benzonatate 100 mg oral capsule  -- 1 cap(s) by mouth once a day  -- Indication: For Cough    Proventil HFA 90 mcg/inh inhalation aerosol  -- 2 puff(s) inhaled 4 times a day, As Needed  -- Indication: For Asthma      guaiFENesin 100 mg/5 mL oral liquid  -- 5 milliliter(s) by mouth every 6 hours, As needed, Cough  -- Indication: For Cough    senna oral tablet  -- 2 tab(s) by mouth once a day (at bedtime)  -- Indication: For Constipation    docusate sodium 100 mg oral capsule  -- 1 cap(s) by mouth 3 times a day  -- Indication: For Constipation    polyethylene glycol 3350 oral powder for reconstitution  -- 17 gram(s) by mouth once a day, As needed, Constipation  -- Indication: For Constipation    montelukast 10 mg oral tablet  -- 1 tab(s) by mouth once a day. AM  -- Indication: For Asthma      Levaquin 500 mg oral tablet  -- 1 tab(s) by mouth every 24 hours   -- Avoid prolonged or excessive exposure to direct and/or artificial sunlight while taking this medication.  Do not take dairy products, antacids, or iron preparations within one hour of this medication.  Finish all this medication unless otherwise directed by prescriber.  May cause drowsiness or dizziness.  Medication should be taken with plenty of water.    -- Indication: For Cough/pneumonia

## 2018-01-25 NOTE — PROGRESS NOTE ADULT - ASSESSMENT
77F Mandarin speaking PMH breast CA, lung CA (s/p lobectomy), ovarian CA (s/p surgery, currently undergoing chemotherapy), asthma, presents with 1 month of worsening cough and pleuritic chest pain most concerning for worsening asthma.  [place] [facility] [place]xi/clay for ovarian cancer- s/p 9 cycles of carbo/taxol- per their notes pt has had chest tightness for many months.  pt also w/ right lung changes similar to those on our CT chest now.
77 year old with lung and ovarian cancer presents with fever, shortness of breath, and cough.    This may all be related to her malignancy, but she is immunocompromised.     Her sputums for afb are negative- can d/c airborne.    Unclear if there is an underlying pneumonia or if her presentation is all due to malignancy.     In light of fever, may be safer to finish an abx course.  Continue antibiotics through 1/29  When stable for discharge, change to levaquin 500 mg po daily.    Follow up with her oncologist to ensure lung mass is stable and consistent with her known malignancy.
77 year old with lung and ovarian cancer presents with fever, shortness of breath, and cough.    This may all be related to her malignancy, but she is immunocompromised.     I would prefer to cover with zosyn at this time.    RUL mass seems chronic but she does have calcified mediastinal nodes and is from an endemic area for Tb.  Check quantiferon.  Airborne isolation for now.   Check sputum afb times three
77F Mandarin speaking PMH breast CA, lung CA (s/p lobectomy), ovarian CA (s/p surgery, currently undergoing chemotherapy), asthma, presents with 1 month of worsening cough and pleuritic chest pain most concerning for worsening asthma.  [place] [facility] [place]clay for ovarian cancer- s/p 9 cycles of carbo/taxol- per their notes pt has had chest tightness for many months.  pt also w/ right lung changes similar to those on our CT chest now.  spoke with her PCP and oncologist - no prior testing for TB done as outpatient.
77F Mandarin speaking PMH breast CA, lung CA (s/p lobectomy), ovarian CA (s/p surgery, currently undergoing chemotherapy), asthma, presents with 1 month of worsening cough and pleuritic chest pain most concerning for worsening asthma.  obtained records from [facility] [place]- pt on doxi/clay for ovarian cancer- s/p 9 cycles of carbo/taxol- per their notes pt has had chest tightness for many months.  pt also w/ right lung changes similar to those on our CT chest now.  spoke with her PCP and oncologist - no prior testing for TB done as outpatient.  on abx for possible pna- cough sounds chronic but pt doesn't want rx for it till she sees her oncologist

## 2018-01-25 NOTE — DISCHARGE NOTE ADULT - HOSPITAL COURSE
77F Mandarin speaking PMH breast CA, lung CA (s/p lobectomy), ovarian CA (s/p surgery, currently undergoing chemotherapy), asthma, presents with 1 month of worsening cough and pleuritic chest pain. CTA chest showed   Bilateral pleural calcifications and pleural thickening suggestive of   prior infection or inflammation and  small calcified mediastinal lymph nodes and calcified pericardium may be the sequelae of prior infection. Pt was kept in airborne precautions till TB was ruled out with 3 negative AFB sputum tests. Unclear if there is an underlying pneumonia or if her presentation is all due to malignancy.    In light of pt's immunocompromised state, she was treated prophylactically with Zosyn and will be discharged and complete course of Levaquin. Pt to follow up with her oncologist. 77F Mandarin speaking PMH breast CA, lung CA (s/p lobectomy), ovarian CA (s/p surgery, currently undergoing chemotherapy), asthma, presents with 1 month of worsening cough and pleuritic chest pain. CTA chest showed   Bilateral pleural calcifications and pleural thickening suggestive of   prior infection or inflammation and  small calcified mediastinal lymph nodes and calcified pericardium may be the sequelae of prior infection. Pt was kept in airborne precautions till TB was ruled out with 3 negative AFB sputum tests. Unclear if there is an underlying pneumonia or if her presentation is all due to malignancy.  pt was seen by ID consult and pulmonary consult as well.     In light of pt's immunocompromised state, she was treated prophylactically with Zosyn and will be discharged and complete course of Levaquin. Pt to follow up with her oncologist.   patient reluctant to take any meds for chronic cough i.e GERD rx, or post nasal drip rx till she sees her oncologist at [facility].  repeatedly asking to leave so she could go home and get her nutrition; she was very worried about her chemo and that she will miss her

## 2018-01-25 NOTE — PROGRESS NOTE ADULT - PROBLEM SELECTOR PLAN 4
- Improve score 3 - lovenox for dvt prophylaxis
- history of chronic hepatitis B  - was on entecavir - unclear if finished course - f/u with PCP in am
- Improve score 3 - lovenox for dvt prophylaxis

## 2018-01-25 NOTE — PROGRESS NOTE ADULT - PROBLEM SELECTOR PLAN 2
- pt reports worsening cough, chest tightness, wheezing- although this is chronic- doubt need for steroids.  will get pulm eval.    - RVP negative   - home meds include albuterol inhaler, combivent, advair, montelukast  - c/w duonebs every 6 hours, advair, montelukast, will hold off on steroids
- s/p surgery, currently undergoing chemotherapy  - [place] [facility] [place]
- s/p surgery, currently undergoing chemotherapy  - records in chart from [facility] [place]

## 2018-01-27 LAB
BACTERIA BLD CULT: SIGNIFICANT CHANGE UP
BACTERIA BLD CULT: SIGNIFICANT CHANGE UP

## 2018-02-08 LAB — ACID FAST STN SPT: SIGNIFICANT CHANGE UP

## 2018-02-14 LAB — ACID FAST STN SPT: SIGNIFICANT CHANGE UP

## 2018-02-18 LAB — ACID FAST STN SPT: SIGNIFICANT CHANGE UP

## 2018-03-08 LAB — ACID FAST STN SPT: SIGNIFICANT CHANGE UP

## 2018-09-22 ENCOUNTER — INPATIENT (INPATIENT)
Facility: HOSPITAL | Age: 78
LOS: 2 days | Discharge: NURSING, REHAB OR LONG-TERM CARE | DRG: 951 | End: 2018-09-25
Attending: INTERNAL MEDICINE | Admitting: INTERNAL MEDICINE
Payer: MEDICARE

## 2018-09-22 VITALS
DIASTOLIC BLOOD PRESSURE: 67 MMHG | OXYGEN SATURATION: 96 % | WEIGHT: 101.41 LBS | HEIGHT: 64 IN | TEMPERATURE: 98 F | HEART RATE: 91 BPM | RESPIRATION RATE: 18 BRPM | SYSTOLIC BLOOD PRESSURE: 103 MMHG

## 2018-09-22 DIAGNOSIS — C56.1 MALIGNANT NEOPLASM OF RIGHT OVARY: ICD-10-CM

## 2018-09-22 DIAGNOSIS — Z90.2 ACQUIRED ABSENCE OF LUNG [PART OF]: Chronic | ICD-10-CM

## 2018-09-22 DIAGNOSIS — D72.825 BANDEMIA: ICD-10-CM

## 2018-09-22 DIAGNOSIS — Z98.89 OTHER SPECIFIED POSTPROCEDURAL STATES: Chronic | ICD-10-CM

## 2018-09-22 PROBLEM — R19.00 INTRA-ABDOMINAL AND PELVIC SWELLING, MASS AND LUMP, UNSPECIFIED SITE: Chronic | Status: ACTIVE | Noted: 2017-03-02

## 2018-09-22 LAB
ALBUMIN SERPL ELPH-MCNC: 2.3 G/DL — LOW (ref 3.3–5)
ALP SERPL-CCNC: 101 U/L — SIGNIFICANT CHANGE UP (ref 40–120)
ALT FLD-CCNC: 6 U/L — LOW (ref 10–45)
ANION GAP SERPL CALC-SCNC: 14 MMOL/L — SIGNIFICANT CHANGE UP (ref 5–17)
APTT BLD: 31 SEC — SIGNIFICANT CHANGE UP (ref 27.5–37.4)
AST SERPL-CCNC: 13 U/L — SIGNIFICANT CHANGE UP (ref 10–40)
BASE EXCESS BLDV CALC-SCNC: 2.9 MMOL/L — HIGH (ref -2–2)
BASOPHILS # BLD AUTO: 0 K/UL — SIGNIFICANT CHANGE UP (ref 0–0.2)
BILIRUB SERPL-MCNC: 0.7 MG/DL — SIGNIFICANT CHANGE UP (ref 0.2–1.2)
BUN SERPL-MCNC: 18 MG/DL — SIGNIFICANT CHANGE UP (ref 7–23)
CA-I SERPL-SCNC: 1.13 MMOL/L — SIGNIFICANT CHANGE UP (ref 1.12–1.3)
CALCIUM SERPL-MCNC: 8.2 MG/DL — LOW (ref 8.4–10.5)
CHLORIDE BLDV-SCNC: 102 MMOL/L — SIGNIFICANT CHANGE UP (ref 96–108)
CHLORIDE SERPL-SCNC: 101 MMOL/L — SIGNIFICANT CHANGE UP (ref 96–108)
CO2 BLDV-SCNC: 30 MMOL/L — SIGNIFICANT CHANGE UP (ref 22–30)
CO2 SERPL-SCNC: 25 MMOL/L — SIGNIFICANT CHANGE UP (ref 22–31)
CREAT SERPL-MCNC: 0.49 MG/DL — LOW (ref 0.5–1.3)
EOSINOPHIL # BLD AUTO: 0 K/UL — SIGNIFICANT CHANGE UP (ref 0–0.5)
GAS PNL BLDV: 136 MMOL/L — SIGNIFICANT CHANGE UP (ref 136–145)
GAS PNL BLDV: SIGNIFICANT CHANGE UP
GAS PNL BLDV: SIGNIFICANT CHANGE UP
GLUCOSE BLDV-MCNC: 98 MG/DL — SIGNIFICANT CHANGE UP (ref 70–99)
GLUCOSE SERPL-MCNC: 100 MG/DL — HIGH (ref 70–99)
HCO3 BLDV-SCNC: 28 MMOL/L — SIGNIFICANT CHANGE UP (ref 21–29)
HCT VFR BLD CALC: 26 % — LOW (ref 34.5–45)
HCT VFR BLDA CALC: 27 % — LOW (ref 39–50)
HGB BLD CALC-MCNC: 8.8 G/DL — LOW (ref 11.5–15.5)
HGB BLD-MCNC: 9 G/DL — LOW (ref 11.5–15.5)
INR BLD: 1.15 RATIO — SIGNIFICANT CHANGE UP (ref 0.88–1.16)
LACTATE BLDV-MCNC: 1.7 MMOL/L — SIGNIFICANT CHANGE UP (ref 0.7–2)
LYMPHOCYTES # BLD AUTO: 0.6 K/UL — LOW (ref 1–3.3)
LYMPHOCYTES # BLD AUTO: 7 % — LOW (ref 13–44)
MCHC RBC-ENTMCNC: 34.3 PG — HIGH (ref 27–34)
MCHC RBC-ENTMCNC: 34.6 GM/DL — SIGNIFICANT CHANGE UP (ref 32–36)
MCV RBC AUTO: 99 FL — SIGNIFICANT CHANGE UP (ref 80–100)
MONOCYTES # BLD AUTO: 2.2 K/UL — HIGH (ref 0–0.9)
MONOCYTES NFR BLD AUTO: 10 % — SIGNIFICANT CHANGE UP (ref 2–14)
NEUTROPHILS # BLD AUTO: 13.3 K/UL — HIGH (ref 1.8–7.4)
NEUTROPHILS NFR BLD AUTO: 69 % — SIGNIFICANT CHANGE UP (ref 43–77)
NEUTS BAND # BLD: 14 % — HIGH (ref 0–8)
PCO2 BLDV: 50 MMHG — SIGNIFICANT CHANGE UP (ref 35–50)
PH BLDV: 7.37 — SIGNIFICANT CHANGE UP (ref 7.35–7.45)
PLAT MORPH BLD: NORMAL — SIGNIFICANT CHANGE UP
PLATELET # BLD AUTO: 460 K/UL — HIGH (ref 150–400)
PO2 BLDV: 29 MMHG — SIGNIFICANT CHANGE UP (ref 25–45)
POTASSIUM BLDV-SCNC: 3.7 MMOL/L — SIGNIFICANT CHANGE UP (ref 3.5–5.3)
POTASSIUM SERPL-MCNC: 3.7 MMOL/L — SIGNIFICANT CHANGE UP (ref 3.5–5.3)
POTASSIUM SERPL-SCNC: 3.7 MMOL/L — SIGNIFICANT CHANGE UP (ref 3.5–5.3)
PROT SERPL-MCNC: 6.2 G/DL — SIGNIFICANT CHANGE UP (ref 6–8.3)
PROTHROM AB SERPL-ACNC: 12.6 SEC — SIGNIFICANT CHANGE UP (ref 9.8–12.7)
RBC # BLD: 2.63 M/UL — LOW (ref 3.8–5.2)
RBC # FLD: 15.6 % — HIGH (ref 10.3–14.5)
RBC BLD AUTO: SIGNIFICANT CHANGE UP
SAO2 % BLDV: 44 % — LOW (ref 67–88)
SODIUM SERPL-SCNC: 140 MMOL/L — SIGNIFICANT CHANGE UP (ref 135–145)
WBC # BLD: 16.1 K/UL — HIGH (ref 3.8–10.5)
WBC # FLD AUTO: 16.1 K/UL — HIGH (ref 3.8–10.5)

## 2018-09-22 PROCEDURE — 74177 CT ABD & PELVIS W/CONTRAST: CPT | Mod: 26

## 2018-09-22 PROCEDURE — 71045 X-RAY EXAM CHEST 1 VIEW: CPT | Mod: 26

## 2018-09-22 PROCEDURE — 99285 EMERGENCY DEPT VISIT HI MDM: CPT

## 2018-09-22 RX ORDER — SODIUM CHLORIDE 9 MG/ML
1000 INJECTION INTRAMUSCULAR; INTRAVENOUS; SUBCUTANEOUS
Qty: 0 | Refills: 0 | Status: DISCONTINUED | OUTPATIENT
Start: 2018-09-22 | End: 2018-09-24

## 2018-09-22 RX ORDER — VANCOMYCIN HCL 1 G
1000 VIAL (EA) INTRAVENOUS ONCE
Qty: 0 | Refills: 0 | Status: COMPLETED | OUTPATIENT
Start: 2018-09-22 | End: 2018-09-22

## 2018-09-22 RX ORDER — PANTOPRAZOLE SODIUM 20 MG/1
40 TABLET, DELAYED RELEASE ORAL DAILY
Qty: 0 | Refills: 0 | Status: DISCONTINUED | OUTPATIENT
Start: 2018-09-22 | End: 2018-09-25

## 2018-09-22 RX ORDER — HEPARIN SODIUM 5000 [USP'U]/ML
5000 INJECTION INTRAVENOUS; SUBCUTANEOUS EVERY 12 HOURS
Qty: 0 | Refills: 0 | Status: DISCONTINUED | OUTPATIENT
Start: 2018-09-22 | End: 2018-09-22

## 2018-09-22 RX ORDER — MORPHINE SULFATE 50 MG/1
4 CAPSULE, EXTENDED RELEASE ORAL ONCE
Qty: 0 | Refills: 0 | Status: DISCONTINUED | OUTPATIENT
Start: 2018-09-22 | End: 2018-09-22

## 2018-09-22 RX ORDER — SODIUM CHLORIDE 9 MG/ML
1000 INJECTION INTRAMUSCULAR; INTRAVENOUS; SUBCUTANEOUS ONCE
Qty: 0 | Refills: 0 | Status: COMPLETED | OUTPATIENT
Start: 2018-09-22 | End: 2018-09-22

## 2018-09-22 RX ORDER — ENOXAPARIN SODIUM 100 MG/ML
50 INJECTION SUBCUTANEOUS
Qty: 0 | Refills: 0 | Status: DISCONTINUED | OUTPATIENT
Start: 2018-09-22 | End: 2018-09-24

## 2018-09-22 RX ORDER — PIPERACILLIN AND TAZOBACTAM 4; .5 G/20ML; G/20ML
3.38 INJECTION, POWDER, LYOPHILIZED, FOR SOLUTION INTRAVENOUS EVERY 8 HOURS
Qty: 0 | Refills: 0 | Status: DISCONTINUED | OUTPATIENT
Start: 2018-09-22 | End: 2018-09-25

## 2018-09-22 RX ORDER — PIPERACILLIN AND TAZOBACTAM 4; .5 G/20ML; G/20ML
3.38 INJECTION, POWDER, LYOPHILIZED, FOR SOLUTION INTRAVENOUS ONCE
Qty: 0 | Refills: 0 | Status: COMPLETED | OUTPATIENT
Start: 2018-09-22 | End: 2018-09-22

## 2018-09-22 RX ORDER — INFLUENZA VIRUS VACCINE 15; 15; 15; 15 UG/.5ML; UG/.5ML; UG/.5ML; UG/.5ML
0.5 SUSPENSION INTRAMUSCULAR ONCE
Qty: 0 | Refills: 0 | Status: DISCONTINUED | OUTPATIENT
Start: 2018-09-22 | End: 2018-09-24

## 2018-09-22 RX ORDER — MORPHINE SULFATE 50 MG/1
1 CAPSULE, EXTENDED RELEASE ORAL ONCE
Qty: 0 | Refills: 0 | Status: DISCONTINUED | OUTPATIENT
Start: 2018-09-22 | End: 2018-09-22

## 2018-09-22 RX ADMIN — MORPHINE SULFATE 4 MILLIGRAM(S): 50 CAPSULE, EXTENDED RELEASE ORAL at 17:32

## 2018-09-22 RX ADMIN — PANTOPRAZOLE SODIUM 40 MILLIGRAM(S): 20 TABLET, DELAYED RELEASE ORAL at 17:33

## 2018-09-22 RX ADMIN — MORPHINE SULFATE 4 MILLIGRAM(S): 50 CAPSULE, EXTENDED RELEASE ORAL at 12:17

## 2018-09-22 RX ADMIN — MORPHINE SULFATE 1 MILLIGRAM(S): 50 CAPSULE, EXTENDED RELEASE ORAL at 21:50

## 2018-09-22 RX ADMIN — Medication 250 MILLIGRAM(S): at 17:29

## 2018-09-22 RX ADMIN — PIPERACILLIN AND TAZOBACTAM 200 GRAM(S): 4; .5 INJECTION, POWDER, LYOPHILIZED, FOR SOLUTION INTRAVENOUS at 12:17

## 2018-09-22 RX ADMIN — SODIUM CHLORIDE 1000 MILLILITER(S): 9 INJECTION INTRAMUSCULAR; INTRAVENOUS; SUBCUTANEOUS at 12:18

## 2018-09-22 RX ADMIN — MORPHINE SULFATE 4 MILLIGRAM(S): 50 CAPSULE, EXTENDED RELEASE ORAL at 15:55

## 2018-09-22 RX ADMIN — MORPHINE SULFATE 1 MILLIGRAM(S): 50 CAPSULE, EXTENDED RELEASE ORAL at 21:31

## 2018-09-22 RX ADMIN — Medication 250 MILLIGRAM(S): at 15:55

## 2018-09-22 RX ADMIN — PIPERACILLIN AND TAZOBACTAM 25 GRAM(S): 4; .5 INJECTION, POWDER, LYOPHILIZED, FOR SOLUTION INTRAVENOUS at 21:28

## 2018-09-22 RX ADMIN — SODIUM CHLORIDE 1000 MILLILITER(S): 9 INJECTION INTRAMUSCULAR; INTRAVENOUS; SUBCUTANEOUS at 15:55

## 2018-09-22 RX ADMIN — SODIUM CHLORIDE 1000 MILLILITER(S): 9 INJECTION INTRAMUSCULAR; INTRAVENOUS; SUBCUTANEOUS at 17:29

## 2018-09-22 RX ADMIN — SODIUM CHLORIDE 80 MILLILITER(S): 9 INJECTION INTRAMUSCULAR; INTRAVENOUS; SUBCUTANEOUS at 19:36

## 2018-09-22 NOTE — CONSULT NOTE ADULT - SUBJECTIVE AND OBJECTIVE BOX
General Surgery Consult  Consulting surgical team:  Consulting attending:    HPI: Joana Alejo is a 79 y/o woman with history of hep B, breast cancer (2006), lung cancer in 2016, and ovarian cancer (2017) s/p debulking procedure and chemotherapy (all oncology care at [facility]) who was last admitted at [facility] from 9/4 - 9/19 for a malignant small bowel obstruction. During that admission, a gastrostomy tube was placed as a palliative measure. A tunneled paracentesis catheter was also placed for her chronic ascites.     At home, the patient's daughter began noting drainage from around the gastrostomy tube, which finally prompted her to bring the patient to the ED.     At the time of exam, the patient is complaining of some abdominal pain, but states that it is unchanged from the time of surgery.     The patient's daughter states that she would like to pursue hospice care for the patient, but was not sure how to secure those services and did not know how to take care of the patient at home.     Outpatient Providers:  - Oncology: Dr. Rodriguez  - Gyn Onc: Dr. Saab       PAST MEDICAL HISTORY:  Pelvic mass in female  Headache  Lung mass  Hyperlipidemia  Hepatitis B  Breast cancer      PAST SURGICAL HISTORY:  Status post lobectomy of lung  History of lumpectomy      MEDICATIONS:  enoxaparin Injectable 50 milliGRAM(s) SubCutaneous two times a day  pantoprazole  Injectable 40 milliGRAM(s) IV Push daily  piperacillin/tazobactam IVPB. 3.375 Gram(s) IV Intermittent every 8 hours  sodium chloride 0.9%. 1000 milliLiter(s) IV Continuous <Continuous>      ALLERGIES:  Allergy Status Unknown  No Known Allergies      VITALS & I/Os:  Vital Signs Last 24 Hrs  T(C): 36.8 (22 Sep 2018 12:15), Max: 36.8 (22 Sep 2018 12:15)  T(F): 98.2 (22 Sep 2018 12:15), Max: 98.2 (22 Sep 2018 12:15)  HR: 87 (22 Sep 2018 17:36) (82 - 91)  BP: 121/74 (22 Sep 2018 17:36) (103/67 - 126/66)  BP(mean): --  RR: 20 (22 Sep 2018 17:36) (16 - 20)  SpO2: 100% (22 Sep 2018 17:36) (96% - 100%)    I&O's Summary    22 Sep 2018 07:01  -  22 Sep 2018 19:15  --------------------------------------------------------  IN: 1350 mL / OUT: 0 mL / NET: 1350 mL        PHYSICAL EXAM:  General: No acute distress, awake, alert   Respiratory: Nonlabored  Cardiovascular: normotensive, regular rate   Abdominal: Soft, moderately distended, nontender. No rebound or guarding. No organomegaly, no palpable mass. Tunneled paracentesis catheter in LLQ without sign of infection near insertion site. G tube in place with minimal surrounding erythema and some drainage of clear/yellow fluid  Extremities: Warm    LABS:                        9.0    16.1  )-----------( 460      ( 22 Sep 2018 12:26 )             26.0     09-22    140  |  101  |  18  ----------------------------<  100<H>  3.7   |  25  |  0.49<L>    Ca    8.2<L>      22 Sep 2018 12:26    TPro  6.2  /  Alb  2.3<L>  /  TBili  0.7  /  DBili  x   /  AST  13  /  ALT  6<L>  /  AlkPhos  101  09-22    Lactate:  09-22 @ 12:26  1.7    PT/INR - ( 22 Sep 2018 12:26 )   PT: 12.6 sec;   INR: 1.15 ratio         PTT - ( 22 Sep 2018 12:26 )  PTT:31.0 sec      IMAGING:  < from: CT Abdomen and Pelvis w/ IV Cont (09.22.18 @ 14:59) >  LOWER CHEST: Trace right pleural effusion. Bilateral lower lobe linear   atelectasis. Calcified and noncalcified bilateral pleural plaques.   Calcifications along the pericardium. Cannot exclude a small filling   defect at the bifurcation of what likely represents a right lower lobe   branch pulmonary artery visualized on series 2 image #1.    LIVER: Mild periportal edema.  BILE DUCTS: Normal caliber.  GALLBLADDER: Contracted.  SPLEEN: Within normal limits.  PANCREAS: Within normal limits.  ADRENALS: Within normal limits.  KIDNEYS/URETERS: Within normal limits.    BLADDER: Distended.  REPRODUCTIVE ORGANS: Status post hysterectomy. No adnexal mass.    BOWEL/PERITONEUM: Fluid and air including air-fluid level is seen   throughout the peritoneal cavity with thickened peritoneal enhancement   consistent with peritonitis. There is resultant small bowel and   transverse colon wall edema and enhancement at these bowel loopsare   intraperitoneal.. There is air adjacent to the mid to distal stomach   anteriorly. A gastrostomy tube is visualized within the stomach.   Difficult to assess for peritoneal implants due to decompressed distal   small bowel.  VESSELS:  Atherosclerotic disease. Cannot entirely exclude a partial   filling defects within the left common iliac vein.  RETROPERITONEUM: No lymphadenopathy.    ABDOMINAL WALL: Anasarca. Left lower quadrant abdominal drain is   visualized. Small subcutaneous air adjacent to the PEG tube.  BONES: Mild multilevel degenerative disease. No osseous metastatic   disease.    IMPRESSION:     Fluid and air including air-fluid level is seen throughout the peritoneal   cavity with thickened peritoneal enhancement consistent with significant   peritonitis.   Resultant edematous thickening of the small bowel and transverse colon   within the peritoneal cavity consistent with secondary enteritis/colitis.    Focal intraperitoneal air is noted adjacent to the mid to distal stomach   of uncertain significance as air is seen throughout the intraperitoneal   cavity. If a stomach defect is a clinical concern, recommend further   evaluation.     Cannot entirely exclude a small filling defect within a branch right   lower lobe pulmonary artery on the first image of this study. Recommend   CT examination of the CT angiography of the chest further evaluation.    Recommend lower extremity venous Doppler examination to evaluate the   veins of the left lower extremity.    < end of copied text >

## 2018-09-22 NOTE — CONSULT NOTE ADULT - ASSESSMENT
Joana Alejo is a 77 y/o woman with history of hep B, breast cancer (2006), lung cancer in 2016, and ovarian cancer (2017) s/p debulking procedure and chemotherapy (all oncology care at [facility]) who [place] [facility] [place] for a malignant small bowel obstruction. S/p placement of palliative G-tube and tunnelled paracentesis catheter. Presents to the ED for drainage around the g-tube. CT showing air and fluid throughout the peritoneal cavity with focal air adjacent to the distal stomach.     Plan:  - No surgical intervention  - Recommend palliative care consultation  - Will follow in consultation    Marco A Cansa, PGY2  [id number] Joana Alejo is a 77 y/o woman with history of hep B, breast cancer (2006), lung cancer in 2016, and ovarian cancer (2017) s/p debulking procedure and chemotherapy (all oncology care at [facility]) who was last admitted at [facility] from 9/4 - 9/19 for a malignant small bowel obstruction. S/p placement of palliative G-tube and tunnelled paracentesis catheter. Presents to the ED for drainage around the g-tube. CT showing air and fluid throughout the peritoneal cavity with focal air adjacent to the distal stomach.     Plan:  - Adjusted G-tube bumper to help approximate stomach to anterior abdominal wall  - No surgical intervention  - Recommend palliative care consultation  - Will follow in consultation    Marco A Canas, PGY2  [id number] Joana Alejo is a 77 y/o woman with history of hep B, breast cancer (2006), lung cancer in 2016, and ovarian cancer (2017) s/p debulking procedure and chemotherapy (all oncology care at [facility]) who was last admitted at [facility] [place] from 9/4 - 9/19 for a malignant small bowel obstruction. S/p placement of palliative G-tube and tunnelled paracentesis catheter. Presents to the ED for drainage around the g-tube. CT showing air and fluid throughout the peritoneal cavity with focal air adjacent to the distal stomach.     Plan:  - Adjusted G-tube bumper to help approximate stomach to anterior abdominal wall  - No surgical intervention  - Recommend palliative care consultation  - Discussed with Dr. Laura Canas, PGY2  [id number]

## 2018-09-22 NOTE — ED PROVIDER NOTE - PROGRESS NOTE DETAILS
spoke to surgery regarding prelim read, they have seen and evaluated the patient. awaiting recs for final disposition - Liza Mireles PA-C patient does not wish to be transferred, surgery still waiting to speak to attending but do not feel patient is surgical candidate. will admit to medicine for hospice and palliative consultation. - Liza Mireles PA-C call from rads regarding possible PE. discussed with patient and daughter at bedside - discussed patients status and high concern with many comorbid conditions. explained that it she is not likely an operative candidate. upon further discussion family states she is DNR/DNI. - Liza Mireles PA-C patients daughter states that she is the healthcare proxy, Ms Sushil Duran, but does not have the paperwork with her at this time. will make the inpatient team aware so they can properly address the patients code status - Liza Mireles PA-C

## 2018-09-22 NOTE — H&P ADULT - NSHPPHYSICALEXAM_GEN_ALL_CORE
PHYSICAL EXAMINATION:  Vital Signs Last 24 Hrs  T(C): 36.8 (22 Sep 2018 12:15), Max: 36.8 (22 Sep 2018 12:15)  T(F): 98.2 (22 Sep 2018 12:15), Max: 98.2 (22 Sep 2018 12:15)  HR: 89 (22 Sep 2018 16:06) (84 - 91)  BP: 122/84 (22 Sep 2018 16:06) (103/67 - 122/84)  BP(mean): --  RR: 16 (22 Sep 2018 16:06) (16 - 18)  SpO2: 97% (22 Sep 2018 16:06) (96% - 97%)  CAPILLARY BLOOD GLUCOSE          09-22 @ 07:01  -  09-22 @ 16:19  --------------------------------------------------------  IN: 1350 mL / OUT: 0 mL / NET: 1350 mL        GENERAL: NAD, well-groomed,  HEAD:  atraumatic, normocephalic  EYES: sclera anicteric  ENMT: mucous membranes moist  NECK: supple, No JVD  CHEST/LUNG: clear to auscultation bilaterally;    no      rales   ,   no rhonchi,   HEART: normal S1, S2  ABDOMEN: BS+, soft,  distended,  venting peg and drain for  ascites, foul smelling  EXTREMITIES:      edema    b/l LEs  NEURO: awake, ,     moves all extremities  SKIN: no     rash

## 2018-09-22 NOTE — H&P ADULT - HISTORY OF PRESENT ILLNESS
International Travel? No(1)    	  · HPI Objective Statement: 79 y/o female hx of  hep B,  breast  ca, 2006,   lung CA in 2016,  and ovarian CA  in 2017, currently under treatment with chemo for ovarian ca,  (most recent treatment 3 weeks ago) . patient was recently admitted to [facility]  for  SBO, managed medically for a week and venting peg and   drain for  ascites, was placed . patient was discharged home with drain 2 days ago. drain output has been foul smelling and purulent  patient has also had significant worsening abdominal pain. low grade fever last night to 99.8. went to oncology yesterday who advised she should get set up with hospice for home care , here with daughters who say she is getting much weaker and don't feel they can safely manage her at home.

## 2018-09-22 NOTE — CONSULT NOTE ADULT - SUBJECTIVE AND OBJECTIVE BOX
Chief Complaint:    HPI:  International Travel? No(1)    	  · HPI Objective Statement: 77 y/o female hx of  hep B,  breast  ca, 2006,   lung CA in 2016,  and ovarian CA  in 2017, currently under treatment with chemo for ovarian ca,  (most recent treatment 3 weeks ago) . patient was recently admitted to [facility]  for  SBO, managed medically for a week and venting peg and   drain for  ascites, was placed . patient was discharged home with drain 2 days ago. drain output has been foul smelling and purulent  patient has also had significant worsening abdominal pain. low grade fever last night to 99.8. went to oncology yesterday who advised she should get set up with hospice for home care , here with daughters who say she is getting much weaker and don't feel they can safely manage her at home. (22 Sep 2018 16:16)     According to her daughter, who is an excellent historian, patient was diagnosed with ovarian cancer 2017, treated with neoadjuvant and postoperative chemo but relapsed shortly therafter and has been on several therapies, most recently avastin and topotecan, given at [facility] on 8/31. Shortly after that she was admitted with an SBO which did not resolve with conservative measures and required a drainage PEG.     She was admitted one day after discharge, family brought her to [facility] because of convenience. Complains of continued pain, weakness, poor appetite, nausea. Drainage around PEG site which was evaluated by surgery.      PAST MEDICAL & SURGICAL HISTORY:  Pelvic mass in female: diagnosed via PET/CT in 2/2017  Headache  Lung mass  Hyperlipidemia  Hepatitis B  Breast cancer: 2007 Left Mastectomy  Status post lobectomy of lung: Left Low Lobe 2016  History of lumpectomy: L Breast, 2007      SOCIAL HISTORY:  Smoking - Non smoker   Alcohol - Social  Drugs - No drug use    FAMILY HISTORY:  No pertinent family history in first degree relatives      MEDICATIONS  (STANDING):  enoxaparin Injectable 50 milliGRAM(s) SubCutaneous two times a day  influenza   Vaccine 0.5 milliLiter(s) IntraMuscular once  pantoprazole  Injectable 40 milliGRAM(s) IV Push daily  piperacillin/tazobactam IVPB. 3.375 Gram(s) IV Intermittent every 8 hours  sodium chloride 0.9%. 1000 milliLiter(s) (80 mL/Hr) IV Continuous <Continuous>    MEDICATIONS  (PRN):      Vital Signs Last 24 Hrs  T(C): 37 (22 Sep 2018 20:38), Max: 37 (22 Sep 2018 20:38)  T(F): 98.6 (22 Sep 2018 20:38), Max: 98.6 (22 Sep 2018 20:38)  HR: 90 (22 Sep 2018 20:38) (82 - 91)  BP: 146/76 (22 Sep 2018 20:38) (103/67 - 146/76)  BP(mean): --  RR: 18 (22 Sep 2018 20:38) (16 - 20)  SpO2: 94% (22 Sep 2018 20:38) (94% - 100%)      PHYSICAL EXAM:      Constitutional: chronically ill appearing woman, cachectic, uncomfortable    Eyes: anicteric    ENMT:    Neck:    Lymph nodes: none    Respiratory:clear    Cardiovascular: RRR. murmur    Gastrointestinal:distended, mild tenderness diffusely. PEG and Pleurx catheter    Extremities: no edema    Skin:                    LABS:                          9.0    16.1  )-----------( 460      ( 22 Sep 2018 12:26 )             26.0         Mean Cell Volume : 99.0 fl  Mean Cell Hemoglobin : 34.3 pg  Mean Cell Hemoglobin Concentration : 34.6 gm/dL  Auto Neutrophil # : 13.3 K/uL  Auto Lymphocyte # : 0.6 K/uL  Auto Monocyte # : 2.2 K/uL  Auto Eosinophil # : 0.0 K/uL  Auto Basophil # : 0.0 K/uL  Auto Neutrophil % : 69.0 %  Auto Lymphocyte % : 7.0 %  Auto Monocyte % : 10.0 %  Auto Eosinophil % : x  Auto Basophil % : x      Serial CBC's  09-22 @ 12:26  Hct-26.0 / Hgb-9.0 / Plat-460 / RBC-2.63 / WBC-16.1      09-22    140  |  101  |  18  ----------------------------<  100<H>  3.7   |  25  |  0.49<L>    Ca    8.2<L>      22 Sep 2018 12:26    TPro  6.2  /  Alb  2.3<L>  /  TBili  0.7  /  DBili  x   /  AST  13  /  ALT  6<L>  /  AlkPhos  101  09-22      PT/INR - ( 22 Sep 2018 12:26 )   PT: 12.6 sec;   INR: 1.15 ratio         PTT - ( 22 Sep 2018 12:26 )  PTT:31.0 sec

## 2018-09-22 NOTE — H&P ADULT - NSHPLABSRESULTS_GEN_ALL_CORE
LABS:                        9.0    16.1  )-----------( 460      ( 22 Sep 2018 12:26 )             26.0     09-22    140  |  101  |  18  ----------------------------<  100<H>  3.7   |  25  |  0.49<L>    Ca    8.2<L>      22 Sep 2018 12:26    TPro  6.2  /  Alb  2.3<L>  /  TBili  0.7  /  DBili  x   /  AST  13  /  ALT  6<L>  /  AlkPhos  101  09-22    PT/INR - ( 22 Sep 2018 12:26 )   PT: 12.6 sec;   INR: 1.15 ratio         PTT - ( 22 Sep 2018 12:26 )  PTT:31.0 sec            09-22 @ 12:26  3.7  29

## 2018-09-22 NOTE — ED PROVIDER NOTE - OBJECTIVE STATEMENT
79 y/o female hx of lung CA and ovarian CA currently under treatment with chemo (most recent treatment 3 weeks ago). patient was recently admitted to [facility] in [place] for what sounds like SBO, managed medically for a week and then a procedure was done and drain was put in place. patient was discharged home with drain 2 days ago. drain output has been foul smelling and purulent. patient has also had significant worsening abdominal pain. low grade fever last night to 99.8. went to oncology yesterday who advised she should get set up with hospice for home care, here with daughters who say she is getting much weaker and don't feel they can safely manage her at home.

## 2018-09-22 NOTE — ED ADULT NURSE NOTE - NSIMPLEMENTINTERV_GEN_ALL_ED
Implemented All Universal Safety Interventions:  [place] to call system. Call bell, personal items and telephone within reach. Instruct patient to call for assistance. Room bathroom lighting operational. Non-slip footwear when patient is off stretcher. Physically safe environment: no spills, clutter or unnecessary equipment. Stretcher in lowest position, wheels locked, appropriate side rails in place.

## 2018-09-22 NOTE — H&P ADULT - ASSESSMENT
77F Mandarin speaking   PMH  hep B,  breast CA  , lung CA (s/p lobectomy), ovarian CA (s/p surgery, currently undergoing chemotherapy), asthma,   admitted  with fevers.  foul smelling drainage  from abdomen    [place] [facility] [place] cancer- s/p 9 cycles of carbo/taxol-, in th past  ct abd/ prelim, with abscess/ perforated  viscus  surg eval   zosyn   ID eval  dvt t pps   prognosis  very poor  ideal  for  hospice  family  refusing dnr at present 77F Mandarin speaking   PMH  hep B,  breast CA  , lung CA (s/p lobectomy), ovarian CA (s/p surgery, currently undergoing chemotherapy), asthma,   admitted  with fevers.  foul smelling drainage  from abdomen    [place] [facility] [place]xi/clay for ovarian cancer- s/p 9 cycles of carbo/taxol-, in th past  ct abd/ prelim, with abscess/ perforated  viscus  surg eval   zosyn   ID/ surg/ oncology  eval  dvt t pps   prognosis  very poor  ideal  for  hospice  family  refusing dnr at present     addendum,  7 pm  ct  abdomen, noted   < from: CT Abdomen and Pelvis w/ IV Cont (09.22.18 @ 14:59) >    IMPRESSION:     Fluid and air including air-fluid level is seen throughout the peritoneal   cavity with thickened peritoneal enhancement consistent with significant   peritonitis.   Resultant edematous thickening of the small bowel and transverse colon   within the peritoneal cavity consistent with secondary enteritis/colitis.    Focal intraperitoneal air is noted adjacent to the mid to distal stomach   of uncertain significance as air is seen throughout the intraperitoneal   cavity. If a stomach defect is a clinical concern, recommend further   evaluation.     Cannot entirely exclude a small filling defect within a branch right   lower lobe pulmonary artery on the first image of this study. Recommend   CT examination of the CT angiography of the chest further evaluation.    Recommend lower extremity venous Doppler examination to evaluate the   veins of the left lower extremity.    Findings were discussed with MONTANA Mireles by Dr. Ruiz on 9/22/2018 at   6:38 PM with readback.    < end of copied text >

## 2018-09-22 NOTE — ED ADULT TRIAGE NOTE - CHIEF COMPLAINT QUOTE
s/p abdominal surgery 3 days ago in [facility] in [place],  now daughter stated that pt has drainage and redness at surgical site, pt with colon cancer and on chemo

## 2018-09-22 NOTE — ED ADULT NURSE NOTE - CHIEF COMPLAINT QUOTE
s/p abdominal surgery 3 days ago in [facility] in [place],  now daughter stated that pt has drainage and redness at surgical site, pt with colon cancer and on chemo

## 2018-09-22 NOTE — CONSULT NOTE ADULT - SUBJECTIVE AND OBJECTIVE BOX
CHIEF COMPLAINT:Patient is a 78y old  Female who presents with a chief complaint of drainage from dpeg.    HPI:  International Travel? No(1)    	  · HPI Objective Statement: 77 y/o female hx of  hep B,  breast  ca, 2006,   lung CA in 2016,  and ovarian CA  in 2017, currently under treatment with chemo for ovarian ca,  (most recent treatment 3 weeks ago) . patient was recently admitted to [facility]  for  SBO, managed medically for a week and venting peg and   drain for  ascites, was placed . patient was discharged home with drain 2 days ago. drain output has been foul smelling and purulent  patient has also had significant worsening abdominal pain. low grade fever last night to 99.8. went to oncology yesterday who advised she should get set up with hospice for home care , here with daughters who say she is getting much weaker and don't feel they can safely manage her at home. (22 Sep 2018 16:16)  pt had a cta with evidence of pe and pericardial calcification.    PAST MEDICAL & SURGICAL HISTORY:  Pelvic mass in female: diagnosed via PET/CT in 2/2017  Headache  Lung mass  Hyperlipidemia  Hepatitis B  Breast cancer: 2007 Left Mastectomy  Status post lobectomy of lung: Left Low Lobe 2016  History of lumpectomy: L Breast, 2007      MEDICATIONS  (STANDING):  enoxaparin Injectable 50 milliGRAM(s) SubCutaneous two times a day  pantoprazole  Injectable 40 milliGRAM(s) IV Push daily  piperacillin/tazobactam IVPB. 3.375 Gram(s) IV Intermittent every 8 hours  sodium chloride 0.9%. 1000 milliLiter(s) (80 mL/Hr) IV Continuous <Continuous>    MEDICATIONS  (PRN):      FAMILY HISTORY:  No pertinent family history in first degree relatives      SOCIAL HISTORY:    [ ] Non-smoker  [ ] Smoker  [ ] Alcohol    Allergies    Allergy Status Unknown  No Known Allergies    Intolerances    	    REVIEW OF SYSTEMS:  CONSTITUTIONAL: No fever, weight loss, or fatigue  EYES: No eye pain, visual disturbances, or discharge  ENT:  No difficulty hearing, tinnitus, vertigo; No sinus or throat pain  NECK: No pain or stiffness  RESPIRATORY: No cough, wheezing, chills or hemoptysis; + Shortness of Breath  CARDIOVASCULAR: No chest pain, palpitations, passing out, dizziness, or leg swelling  GASTROINTESTINAL: + abdominal or epigastric pain. No nausea, vomiting, or hematemesis; No diarrhea or constipation. No melena or hematochezia.  GENITOURINARY: No dysuria, frequency, hematuria, or incontinence  NEUROLOGICAL: No headaches, memory loss, loss of strength, numbness, or tremors  SKIN: No itching, burning, rashes, or lesions   LYMPH Nodes: No enlarged glands  ENDOCRINE: No heat or cold intolerance; No hair loss  MUSCULOSKELETAL: No joint pain or swelling; No muscle, back, or extremity pain  PSYCHIATRIC: No depression, anxiety, mood swings, or difficulty sleeping  HEME/LYMPH: No easy bruising, or bleeding gums  ALLERGY AND IMMUNOLOGIC: No hives or eczema	    [ ] All others negative	  [ ] Unable to obtain    PHYSICAL EXAM:  T(C): 36.8 (09-22-18 @ 12:15), Max: 36.8 (09-22-18 @ 12:15)  HR: 87 (09-22-18 @ 17:36) (82 - 91)  BP: 121/74 (09-22-18 @ 17:36) (103/67 - 126/66)  RR: 20 (09-22-18 @ 17:36) (16 - 20)  SpO2: 100% (09-22-18 @ 17:36) (96% - 100%)  Wt(kg): --  I&O's Summary    22 Sep 2018 07:01  -  22 Sep 2018 19:01  --------------------------------------------------------  IN: 1350 mL / OUT: 0 mL / NET: 1350 mL        Appearance: Normal	  HEENT:   Normal oral mucosa, PERRL, EOMI	  Lymphatic: No lymphadenopathy  Cardiovascular: Normal S1 S2, No JVD, + murmurs, No edema  Respiratory: Lungs clear to auscultation	  Psychiatry: A & O x 3, Mood & affect appropriate  Gastrointestinal:  Soft, +-tender, + BS	  Skin: No rashes, No ecchymoses, No cyanosis	  Neurologic: Non-focal  Extremities: Normal range of motion, No clubbing, cyanosis or edema  Vascular: Peripheral pulses palpable 2+ bilaterally    TELEMETRY: 	    ECG:  	  RADIOLOGY:  OTHER: 	  	  LABS:	 	    CARDIAC MARKERS:                              9.0    16.1  )-----------( 460      ( 22 Sep 2018 12:26 )             26.0     09-22    140  |  101  |  18  ----------------------------<  100<H>  3.7   |  25  |  0.49<L>    Ca    8.2<L>      22 Sep 2018 12:26    TPro  6.2  /  Alb  2.3<L>  /  TBili  0.7  /  DBili  x   /  AST  13  /  ALT  6<L>  /  AlkPhos  101  09-22    proBNP:   Lipid Profile:   HgA1c:   TSH:   PT/INR - ( 22 Sep 2018 12:26 )   PT: 12.6 sec;   INR: 1.15 ratio         PTT - ( 22 Sep 2018 12:26 )  PTT:31.0 sec    PREVIOUS DIAGNOSTIC TESTING:    < from: CT Abdomen and Pelvis w/ IV Cont (09.22.18 @ 14:59) >  LOWER CHEST: Trace right pleural effusion. Bilateral lower lobe linear   atelectasis. Calcified and noncalcified bilateral pleural plaques.   Calcifications along the pericardium. Cannot exclude a small filling   defect at the bifurcation of what likely represents a right lower lobe   branch pulmonary artery visualized on series 2 image #1.    LIVER: Mild periportal edema.  BILE DUCTS: Normal caliber.  GALLBLADDER: Contracted.  SPLEEN: Within normal limits.  PANCREAS: Within normal limits.  ADRENALS: Within normal limits.  KIDNEYS/URETERS: Within normal limits.    BLADDER: Distended.  REPRODUCTIVE ORGANS: Status post hysterectomy. No adnexal mass.    BOWEL/PERITONEUM: Fluid and air including air-fluid level is seen   throughout the peritoneal cavity with thickened peritoneal enhancement   consistent with peritonitis. There is resultant small bowel and   transverse colon wall edema and enhancement at these bowel loopsare   intraperitoneal.. There is air adjacent to the mid to distal stomach   anteriorly. A gastrostomy tube is visualized within the stomach.   Difficult to assess for peritoneal implants due to decompressed distal   small bowel.  VESSELS:  Atherosclerotic disease. Cannot entirely exclude a partial   filling defects within the left common iliac vein.  RETROPERITONEUM: No lymphadenopathy.    ABDOMINAL WALL: Anasarca. Left lower quadrant abdominal drain is   visualized. Small subcutaneous air adjacent to the PEG tube.  BONES: Mild multilevel degenerative disease. No osseous metastatic   disease.    IMPRESSION:     Fluid and air including air-fluid level is seen throughout the peritoneal   cavity with thickened peritoneal enhancement consistent with significant

## 2018-09-22 NOTE — H&P ADULT - NSHPREVIEWOFSYSTEMS_GEN_ALL_CORE
REVIEW OF SYSTEMS:  GEN:  fever,    no chills  RESP: no SOB,   no cough  CVS: no chest pain,   no palpitations  GI: no abdominal pain,   no nausea,   no vomiting,   no constipation,   no diarrhea  : no dysuria,   no frequency  NEURO: no headache,   no dizziness  PSYCH: no depression,   not anxious  Derm : no rash

## 2018-09-22 NOTE — CONSULT NOTE ADULT - ASSESSMENT
pt with hx of breast ca, lung ca/ovarian ca s/p recent chemo ,s/p DPEG sec to sbo, with sepsis and pulmonary emboli  pt with very poor prognosis AC if no contraindication  consider palliative care   abx  spoke to the daughter

## 2018-09-22 NOTE — ED PROVIDER NOTE - ATTENDING CONTRIBUTION TO CARE
79 yo female with metastatic lung and ovarian CA on chemo s/p PEG placement p/w worsening abdominal pain, lethargy, foul discharge from PEG site.  Seen by heme/onc yesterday who recommended hospice.  Will check labs here, CT abdomen/pelvis, likely admit with palliative care consult.

## 2018-09-22 NOTE — CONSULT NOTE ADULT - ASSESSMENT
78 year old woman with refractory ovarian cancer with disease related SBO requiring PEG drainage tube, admitted with pain, FTT. Overall prognosis is extremely poor

## 2018-09-22 NOTE — CONSULT NOTE ADULT - PROBLEM SELECTOR RECOMMENDATION 9
not a candidate for further chemo. Daughter is aware of this and is agreeable to palliative care evaluation

## 2018-09-23 LAB
ANION GAP SERPL CALC-SCNC: 10 MMOL/L — SIGNIFICANT CHANGE UP (ref 5–17)
BUN SERPL-MCNC: 12 MG/DL — SIGNIFICANT CHANGE UP (ref 7–23)
CALCIUM SERPL-MCNC: 7.8 MG/DL — LOW (ref 8.4–10.5)
CHLORIDE SERPL-SCNC: 103 MMOL/L — SIGNIFICANT CHANGE UP (ref 96–108)
CO2 SERPL-SCNC: 25 MMOL/L — SIGNIFICANT CHANGE UP (ref 22–31)
CREAT SERPL-MCNC: 0.46 MG/DL — LOW (ref 0.5–1.3)
GLUCOSE SERPL-MCNC: 96 MG/DL — SIGNIFICANT CHANGE UP (ref 70–99)
HCT VFR BLD CALC: 27.9 % — LOW (ref 34.5–45)
HGB BLD-MCNC: 8.5 G/DL — LOW (ref 11.5–15.5)
MCHC RBC-ENTMCNC: 30.5 GM/DL — LOW (ref 32–36)
MCHC RBC-ENTMCNC: 30.8 PG — SIGNIFICANT CHANGE UP (ref 27–34)
MCV RBC AUTO: 101.1 FL — HIGH (ref 80–100)
PLATELET # BLD AUTO: 433 K/UL — HIGH (ref 150–400)
POTASSIUM SERPL-MCNC: 3.2 MMOL/L — LOW (ref 3.5–5.3)
POTASSIUM SERPL-SCNC: 3.2 MMOL/L — LOW (ref 3.5–5.3)
RBC # BLD: 2.76 M/UL — LOW (ref 3.8–5.2)
RBC # FLD: 16.8 % — HIGH (ref 10.3–14.5)
SODIUM SERPL-SCNC: 138 MMOL/L — SIGNIFICANT CHANGE UP (ref 135–145)
WBC # BLD: 15.56 K/UL — HIGH (ref 3.8–10.5)
WBC # FLD AUTO: 15.56 K/UL — HIGH (ref 3.8–10.5)

## 2018-09-23 PROCEDURE — 93010 ELECTROCARDIOGRAM REPORT: CPT

## 2018-09-23 PROCEDURE — 71275 CT ANGIOGRAPHY CHEST: CPT | Mod: 26

## 2018-09-23 PROCEDURE — 99223 1ST HOSP IP/OBS HIGH 75: CPT

## 2018-09-23 RX ORDER — POTASSIUM CHLORIDE 20 MEQ
10 PACKET (EA) ORAL
Qty: 0 | Refills: 0 | Status: COMPLETED | OUTPATIENT
Start: 2018-09-23 | End: 2018-09-23

## 2018-09-23 RX ORDER — MORPHINE SULFATE 50 MG/1
1 CAPSULE, EXTENDED RELEASE ORAL ONCE
Qty: 0 | Refills: 0 | Status: DISCONTINUED | OUTPATIENT
Start: 2018-09-23 | End: 2018-09-23

## 2018-09-23 RX ORDER — MORPHINE SULFATE 50 MG/1
4 CAPSULE, EXTENDED RELEASE ORAL EVERY 6 HOURS
Qty: 0 | Refills: 0 | Status: DISCONTINUED | OUTPATIENT
Start: 2018-09-23 | End: 2018-09-24

## 2018-09-23 RX ORDER — MORPHINE SULFATE 50 MG/1
2 CAPSULE, EXTENDED RELEASE ORAL EVERY 4 HOURS
Qty: 0 | Refills: 0 | Status: DISCONTINUED | OUTPATIENT
Start: 2018-09-23 | End: 2018-09-24

## 2018-09-23 RX ADMIN — Medication 100 MILLIEQUIVALENT(S): at 21:04

## 2018-09-23 RX ADMIN — MORPHINE SULFATE 2 MILLIGRAM(S): 50 CAPSULE, EXTENDED RELEASE ORAL at 14:26

## 2018-09-23 RX ADMIN — Medication 100 MILLIEQUIVALENT(S): at 19:35

## 2018-09-23 RX ADMIN — MORPHINE SULFATE 4 MILLIGRAM(S): 50 CAPSULE, EXTENDED RELEASE ORAL at 22:30

## 2018-09-23 RX ADMIN — MORPHINE SULFATE 1 MILLIGRAM(S): 50 CAPSULE, EXTENDED RELEASE ORAL at 01:30

## 2018-09-23 RX ADMIN — MORPHINE SULFATE 1 MILLIGRAM(S): 50 CAPSULE, EXTENDED RELEASE ORAL at 01:14

## 2018-09-23 RX ADMIN — PIPERACILLIN AND TAZOBACTAM 25 GRAM(S): 4; .5 INJECTION, POWDER, LYOPHILIZED, FOR SOLUTION INTRAVENOUS at 14:26

## 2018-09-23 RX ADMIN — Medication 100 MILLIEQUIVALENT(S): at 20:13

## 2018-09-23 RX ADMIN — ENOXAPARIN SODIUM 50 MILLIGRAM(S): 100 INJECTION SUBCUTANEOUS at 17:51

## 2018-09-23 RX ADMIN — MORPHINE SULFATE 1 MILLIGRAM(S): 50 CAPSULE, EXTENDED RELEASE ORAL at 09:40

## 2018-09-23 RX ADMIN — MORPHINE SULFATE 1 MILLIGRAM(S): 50 CAPSULE, EXTENDED RELEASE ORAL at 09:17

## 2018-09-23 RX ADMIN — MORPHINE SULFATE 2 MILLIGRAM(S): 50 CAPSULE, EXTENDED RELEASE ORAL at 14:40

## 2018-09-23 RX ADMIN — PIPERACILLIN AND TAZOBACTAM 25 GRAM(S): 4; .5 INJECTION, POWDER, LYOPHILIZED, FOR SOLUTION INTRAVENOUS at 21:04

## 2018-09-23 RX ADMIN — PANTOPRAZOLE SODIUM 40 MILLIGRAM(S): 20 TABLET, DELAYED RELEASE ORAL at 14:26

## 2018-09-23 RX ADMIN — ENOXAPARIN SODIUM 50 MILLIGRAM(S): 100 INJECTION SUBCUTANEOUS at 05:02

## 2018-09-23 RX ADMIN — PIPERACILLIN AND TAZOBACTAM 25 GRAM(S): 4; .5 INJECTION, POWDER, LYOPHILIZED, FOR SOLUTION INTRAVENOUS at 05:01

## 2018-09-23 RX ADMIN — MORPHINE SULFATE 4 MILLIGRAM(S): 50 CAPSULE, EXTENDED RELEASE ORAL at 21:56

## 2018-09-23 RX ADMIN — MORPHINE SULFATE 2 MILLIGRAM(S): 50 CAPSULE, EXTENDED RELEASE ORAL at 20:00

## 2018-09-23 RX ADMIN — MORPHINE SULFATE 2 MILLIGRAM(S): 50 CAPSULE, EXTENDED RELEASE ORAL at 19:58

## 2018-09-23 NOTE — CONSULT NOTE ADULT - ASSESSMENT
77 yo F, ovarian ca, prior chemo, [place] [facility] [place] and management of ascites, now with increased abdom pain, foul smelling MAYDA drainage, and CT with free air- changes c/w peritonitis, as well as colitis/enteritis.  Exam is tense, peritoneal signs present.  Concern for perf- ?link to advanced underlying malignancy, ?migration of PEG, ?complication of prior SBO; regardless, I doubt any abx regimen will impact this.  She was deemed not to be a surgical candidate at [facility], and currently I would rec palliative measures only.  Prognosis poor.    Plan:  Will follow on empiric Zosyn for now, recognizing limitations as above  Perf, peritonitis, likely enteric content in abdom, underlying malignancy, poor prognosis, palliative vs hospice, d/w daughters at length

## 2018-09-23 NOTE — CONSULT NOTE ADULT - SUBJECTIVE AND OBJECTIVE BOX
HPI:   Patient is a 78y female with mult malignances, latest ovarian ca, persistent pelvic dz according to daughter, recently [place] [facility] [place], including [place].  She had abdom drain (ascites) and venting PEG placed, with pt not eating for > 1 wk..  She was d/pablo home, only to present here 2 days later with foul smelling drainage, abdom pain; drain now with no output.  Pt quite debilitated, asking for pain medication, info otherwise reviewed with daughter.    REVIEW OF SYSTEMS:  All other review of systems negative (Comprehensive ROS)    PAST MEDICAL & SURGICAL HISTORY:  Pelvic mass in female: diagnosed via PET/CT in 2/2017  Headache  Lung mass  Hyperlipidemia  Hepatitis B  Breast cancer: 2007 Left Mastectomy  Status post lobectomy of lung: Left Low Lobe 2016  History of lumpectomy: L Breast, 2007      Allergies  Allergy Status Unknown  No Known Allergies    Antimicrobials Day # 2  piperacillin/tazobactam IVPB. 3.375 Gram(s) IV Intermittent every 8 hours    Other Medications:  enoxaparin Injectable 50 milliGRAM(s) SubCutaneous two times a day  influenza   Vaccine 0.5 milliLiter(s) IntraMuscular once  morphine  - Injectable 2 milliGRAM(s) IV Push every 4 hours PRN  morphine  - Injectable 4 milliGRAM(s) IV Push every 6 hours PRN  pantoprazole  Injectable 40 milliGRAM(s) IV Push daily  sodium chloride 0.9%. 1000 milliLiter(s) IV Continuous <Continuous>      FAMILY HISTORY:  No pertinent family history in first degree relatives      SOCIAL HISTORY:  Smoking: no   ETOH: no            T(F): 98.3 (09-23-18 @ 06:59), Max: 98.6 (09-22-18 @ 20:38)  HR: 93 (09-23-18 @ 06:59)  BP: 111/70 (09-23-18 @ 06:59)  RR: 18 (09-23-18 @ 06:59)  SpO2: 95% (09-23-18 @ 06:59)  Wt(kg): --    PHYSICAL EXAM:  General: alert,mild distress  Eyes:  anicteric, no conjunctival injection, no discharge  Oropharynx: no lesions or injection 	  Neck: supple, without adenopathy  Lungs: diminished bases  Heart: regular rate and rhythm; no murmur, rubs or gallops  Abdomen: tense, distended, hardened, tender diffusely with minimal palp, G tube and MAYDA in place  Skin: no lesions  Extremities: leg edema  Neurologic: moves all extremities    LAB RESULTS:                        8.5    15.56 )-----------( 433      ( 23 Sep 2018 11:05 )             27.9     09-23    138  |  103  |  12  ----------------------------<  96  3.2<L>   |  25  |  0.46<L>    Ca    7.8<L>      23 Sep 2018 08:55    TPro  6.2  /  Alb  2.3<L>  /  TBili  0.7  /  DBili  x   /  AST  13  /  ALT  6<L>  /  AlkPhos  101  09-22      MICROBIOLOGY:  RECENT CULTURES:  Pending      RADIOLOGY REVIEWED:  CT Abdomen and Pelvis w/ IV Cont (09.22.18 @ 14:59) >  Fluid and air including air-fluid level is seen throughout the peritoneal   cavity with thickened peritoneal enhancement consistent with significant   peritonitis.   Resultant edematous thickening of the small bowel and transverse colon   within the peritoneal cavity consistent with secondary enteritis/colitis.    Focal intraperitoneal air is noted adjacent to the mid to distal stomach   of uncertain significance as air is seen throughout the intraperitoneal   cavity. If a stomach defect is a clinical concern, recommend further   evaluation.     Cannot entirely exclude a small filling defect within a branch right   lower lobe pulmonary artery on the first image of this study. Recommend   CT examination of the CT angiography of the chest further evaluation.    Recommend lower extremity venous Doppler examination to evaluate the   veins of the left lower extremity.

## 2018-09-23 NOTE — PROGRESS NOTE ADULT - ASSESSMENT
77F Mandarin speaking   PMH  hep B,  breast CA  , lung CA (s/p lobectomy), ovarian CA (s/p surgery, currently undergoing chemotherapy), asthma,   admitted  with fevers.  foul smelling drainage  from abdomen    [place] [facility] [place]xi/clay for ovarian cancer- s/p 9 cycles of carbo/taxol-, in th past  ct abd/ prelim, with abscess/ perforated  viscus  surg eval, no intervention, given poor  prognosis   zosyn   ID/ surg/ oncology  eval  dvt t pps   prognosis  very poor  ideal  for  hospice  dnr now  swallow  eval  PE. , on lovenox  awaiting pallaitive  care  on morphine  for pain    ct  abdomen, noted   < from: CT Abdomen and Pelvis w/ IV Cont (09.22.18 @ 14:59) >    IMPRESSION:     Fluid and air including air-fluid level is seen throughout the peritoneal   cavity with thickened peritoneal enhancement consistent with significant   peritonitis.   Resultant edematous thickening of the small bowel and transverse colon   within the peritoneal cavity consistent with secondary enteritis/colitis.    Focal intraperitoneal air is noted adjacent to the mid to distal stomach   of uncertain significance as air is seen throughout the intraperitoneal   cavity. If a stomach defect is a clinical concern, recommend further   evaluation.     Cannot entirely exclude a small filling defect within a branch right   lower lobe pulmonary artery on the first image of this study. Recommend   CT examination of the CT angiography of the chest further evaluation.    Recommend lower extremity venous Doppler examination to evaluate the   veins of the left lower extremity.    Findings were discussed with MON[place]NA Mireles by Dr. Ruiz on 9/22/2018 at   6:38 PM with readback.    < end of copied text > 77F Mandarin speaking   PMH  hep B,  breast CA  , lung CA (s/p lobectomy), ovarian CA .  asthma,     admitted  with fevers.  foul smelling drainage  from abdomen    [place] [facility] [place], and family , was  told,  pt  needs  hospice  seen by oncology  here  surg eval, no intervention, given poor  prognosis/ on  zosyn  ct  abdomen,  PE/  on lovenox    prognosis  very poor/ideal  for  hospice/ pt is  dnr / palliative care  swallow  eval/ on pleasure feeds at  daughters request  on morphine  for pain  ct  abdomen, noted  spoke with  daughter       < from: CT Abdomen and Pelvis w/ IV Cont (09.22.18 @ 14:59) >  IMPRESSION:     Fluid and air including air-fluid level is seen throughout the peritoneal   cavity with thickened peritoneal enhancement consistent with significant   peritonitis.   Resultant edematous thickening of the small bowel and transverse colon   within the peritoneal cavity consistent with secondary enteritis/colitis.  Focal intraperitoneal air is noted adjacent to the mid to distal stomach   of uncertain significance as air is seen throughout the intraperitoneal   cavity. If a stomach defect is a clinical concern, recommend further   evaluation.   Cannot entirely exclude a small filling defect within a branch right   lower lobe pulmonary artery on the first image of this study. Recommend   CT examination of the CT angiography of the chest further evaluation.  Recommend lower extremity venous Doppler examination to evaluate the   veins of the left lower extremity.  Findings were discussed with MON[place]NA Mireles by Dr. Ruiz on 9/22/2018 at   6:38 PM with readback.    < end of copied text >

## 2018-09-23 NOTE — CONSULT NOTE ADULT - ASSESSMENT
78 F with metastatic ovarian cancer, malignant ascites, neoplasm related pain, encounter for palliative care.

## 2018-09-23 NOTE — CHART NOTE - NSCHARTNOTEFT_GEN_A_CORE
Pt' daughter at bedside requesting DNR documents. Documents provided. Per Dr. Almeida- Palliative consult and to start Morphine 2mg q4hr for moderate and 4mg q6hr for severe pain.   Palliative called: will see Pt tomorrow. DNR paperwork placed in Pt's file.

## 2018-09-23 NOTE — CONSULT NOTE ADULT - PROBLEM SELECTOR RECOMMENDATION 2
Patient with tunnelled catheter.  Drain as needed for comfort.  Patient with venting PEG secondary to malignant SBO.

## 2018-09-23 NOTE — CONSULT NOTE ADULT - PROBLEM SELECTOR RECOMMENDATION 4
Patient is DNR.  No further disease modifying interventions available.  Hospice referral in morning.

## 2018-09-23 NOTE — PROGRESS NOTE ADULT - SUBJECTIVE AND OBJECTIVE BOX
has  diffuse  pain  ekg, nsr  REVIEW OF SYSTEMS:  GEN: no fever,    no chills  RESP: no SOB,   no cough  CVS: no chest pain,   no palpitations  GI: no abdominal pain,   no nausea,   no vomiting,   no constipation,   no diarrhea  : no dysuria,   no frequency  NEURO: no headache,   no dizziness  PSYCH: no depression,   not anxious  Derm : no rash    MEDICATIONS  (STANDING):  enoxaparin Injectable 50 milliGRAM(s) SubCutaneous two times a day  influenza   Vaccine 0.5 milliLiter(s) IntraMuscular once  pantoprazole  Injectable 40 milliGRAM(s) IV Push daily  piperacillin/tazobactam IVPB. 3.375 Gram(s) IV Intermittent every 8 hours  sodium chloride 0.9%. 1000 milliLiter(s) (80 mL/Hr) IV Continuous <Continuous>    MEDICATIONS  (PRN):  morphine  - Injectable 2 milliGRAM(s) IV Push every 4 hours PRN Moderate Pain (4 - 6)  morphine  - Injectable 4 milliGRAM(s) IV Push every 6 hours PRN Severe Pain (7 - 10)      Vital Signs Last 24 Hrs  T(C): 36.8 (23 Sep 2018 06:59), Max: 37 (22 Sep 2018 20:38)  T(F): 98.3 (23 Sep 2018 06:59), Max: 98.6 (22 Sep 2018 20:38)  HR: 93 (23 Sep 2018 06:59) (82 - 93)  BP: 111/70 (23 Sep 2018 06:59) (103/67 - 146/76)  BP(mean): --  RR: 18 (23 Sep 2018 06:59) (16 - 20)  SpO2: 95% (23 Sep 2018 06:59) (94% - 100%)  CAPILLARY BLOOD GLUCOSE        I&O's Summary    22 Sep 2018 07:01  -  23 Sep 2018 07:00  --------------------------------------------------------  IN: 1350 mL / OUT: 400 mL / NET: 950 mL        PHYSICAL EXAM:  HEAD:  Atraumatic, Normocephalic  NECK: Supple, No   JVD  CHEST/LUNG:   no     rales,     no,    rhonchi  HEART: Regular rate and rhythm;         murmur  ABDOMEN: Soft, distended   EXTREMITIES:        edema  NEUROLOGY:  alert    LABS:                        9.0    16.1  )-----------( 460      ( 22 Sep 2018 12:26 )             26.0     09-23    138  |  103  |  12  ----------------------------<  96  3.2<L>   |  25  |  0.46<L>    Ca    7.8<L>      23 Sep 2018 08:55    TPro  6.2  /  Alb  2.3<L>  /  TBili  0.7  /  DBili  x   /  AST  13  /  ALT  6<L>  /  AlkPhos  101  09-22    PT/INR - ( 22 Sep 2018 12:26 )   PT: 12.6 sec;   INR: 1.15 ratio         PTT - ( 22 Sep 2018 12:26 )  PTT:31.0 sec            09-22 @ 12:26  3.7  29              Consultant(s) Notes Reviewed:      Care Discussed with Consultants/Other Providers:

## 2018-09-23 NOTE — CONSULT NOTE ADULT - SUBJECTIVE AND OBJECTIVE BOX
HPI:    · HPI Objective Statement: 79 y/o female hx of  hep B,  breast  ca, 2006,   lung CA in 2016,  and ovarian CA  in 2017, currently under treatment with chemo for ovarian ca,  (most recent treatment 3 weeks ago) . patient was recently admitted to [facility]  for  SBO, managed medically for a week and venting peg and   drain for  ascites, was placed . patient was discharged home with drain 2 days ago. drain output has been foul smelling and purulent  patient has also had significant worsening abdominal pain. low grade fever last night to 99.8. went to oncology yesterday who advised she should get set up with hospice for home care , here with daughters who say she is getting much weaker and don't feel they can safely manage her at home. (22 Sep 2018 16:16)    PERTINENT PM/SXH:   Pelvic mass in female  Headache  Lung mass  Hyperlipidemia  Hepatitis B  Breast cancer    Status post lobectomy of lung  History of lumpectomy    FAMILY HISTORY:  No pertinent family history in first degree relatives    ITEMS NOT CHECKED ARE NOT PRESENT    SOCIAL HISTORY:   Significant other/partner:  [ x]  Children:  [x ]  Taoism/Spirituality: Budhist  Substance hx:  [x ]   Tobacco hx:  [ ]   Alcohol hx: [ ]   Home Opioid hx:  [x ] I-Stop Reference No:  Living Situation: [x ]Home  [ ]Long term care  [ ]Rehab [ ]Other    ADVANCE DIRECTIVES:    DNR  Yes  Yes  MOLST  [ ]  Living Will  [ ]   DECISION MAKER(s):  [x ] Health Care Proxy(s)  [ ] Surrogate(s)  [ ] Guardian           Name(s): Phone Number(s): Kalen Frances (daughter)    BASELINE (I)ADL(s) (prior to admission):  [place]: [ ]Total  [x ] Moderate [ ]Dependent    Allergies    Allergy Status Unknown  No Known Allergies    Intolerances    MEDICATIONS  (STANDING):  enoxaparin Injectable 50 milliGRAM(s) SubCutaneous two times a day  influenza   Vaccine 0.5 milliLiter(s) IntraMuscular once  pantoprazole  Injectable 40 milliGRAM(s) IV Push daily  piperacillin/tazobactam IVPB. 3.375 Gram(s) IV Intermittent every 8 hours  sodium chloride 0.9%. 1000 milliLiter(s) (80 mL/Hr) IV Continuous <Continuous>    MEDICATIONS  (PRN):  morphine  - Injectable 2 milliGRAM(s) IV Push every 4 hours PRN Moderate Pain (4 - 6)  morphine  - Injectable 4 milliGRAM(s) IV Push every 6 hours PRN Severe Pain (7 - 10)    PRESENT SYMPTOMS: [ ]Unable to obtain due to poor mentation   Source if other than patient:  [ ]Family   [ ]Team     Pain (Impact on QOL):  unable to move  Location -      abdomen   Minimal acceptable level (0-10 scale): 5                   Aggravating factors - movement  Quality - sharp  Radiation - none  Severity (0-10 scale) -  10  Timing - intermittent    PAIN AD Score:     [email or web address]/painad.pdf (press ctrl +  left click to view)    Dyspnea:                           [ ]Mild [ x]Moderate [ ]Severe  Anxiety:                             [ ]Mild [x ]Moderate [ ]Severe  Fatigue:                             [ ]Mild [ ]Moderate [x ]Severe  Nausea:                             [x ]Mild [ ]Moderate [ ]Severe  Loss of appetite:              [ ]Mild [ ]Moderate [x ]Severe  Constipation:                    [ ]Mild [ x]Moderate [ ]Severe    Other Symptoms:  [x ]All other review of systems negative     Karnofsky Performance Score/Palliative Performance Status Version 2: 40        %    http://palliative.info/resource_material/PPSv2.pdf  PHYSICAL EXAM:  Vital Signs Last 24 Hrs  T(C): 36.6 (23 Sep 2018 23:13), Max: 36.8 (23 Sep 2018 06:59)  T(F): 97.8 (23 Sep 2018 23:13), Max: 98.3 (23 Sep 2018 06:59)  HR: 92 (23 Sep 2018 23:13) (89 - 93)  BP: 118/70 (23 Sep 2018 23:13) (111/70 - 156/57)  BP(mean): --  RR: 18 (23 Sep 2018 23:13) (18 - 18)  SpO2: 93% (23 Sep 2018 23:13) (93% - 95%) I&O's Summary    22 Sep 2018 07:01  -  23 Sep 2018 07:00  --------------------------------------------------------  IN: 1350 mL / OUT: 400 mL / NET: 950 mL    23 Sep 2018 07:01  -  24 Sep 2018 00:47  --------------------------------------------------------  IN: 0 mL / OUT: 250 mL / NET: -250 mL    GENERAL:  [x ]Alert  [x ]Oriented x 2  [ ]Lethargic  [ ]Cachexia  [ ]Unarousable  [ ]Verbal  [ ]Non-Verbal  Behavioral:   [x ] Anxiety  [ ] Delirium [ ] Agitation [ ] Other  HEENT:  [ ]Normal   [x ]Dry mouth   [ ]ET Tube/Trach  [ ]Oral lesions  PULMONARY:   [x ]Clear [ ]Tachypnea  [ ]Audible excessive secretions   [ ]Rhonchi        [ ]Right [ ]Left [ ]Bilateral  [ ]Crackles        [ ]Right [ ]Left [ ]Bilateral  [ ]Wheezing     [ ]Right [ ]Left [ ]Bilateral  CARDIOVASCULAR:    [x ]Regular [ ]Irregular [ ]Tachy  [ ]Bull [ ]Murmur [ ]Other  GASTROINTESTINAL:  [ ]Soft  [x ]Distended   [x ]+BS  [ ]Non tender [x ]Tender  [ ]PEG [ ]OGT/ NGT  Last BM:   GENITOURINARY:  [ ]Normal [x ] Incontinent   [ ]Oliguria/Anuria   [ ]Das  MUSCULOSKELETAL:   [ ]Normal   [ ]Weakness  [x ]Bed/Wheelchair bound [ ]Edema  NEUROLOGIC:   [x ]No focal deficits  [ ] Cognitive impairment  [ ] Dysphagia [ ]Dysarthria [ ] Paresis [ ]Other   SKIN:   [x ]Normal   [ ]Pressure ulcer(s)  [ ]Rash    CRITICAL CARE:  [ ] Shock Present  [ ]Septic [ ]Cardiogenic [ ]Neurologic [ ]Hypovolemic  [ ]  Vasopressors [ ]  Inotropes   [ ] Respiratory failure present  [ ] Acute  [ ] Chronic [ ] Hypoxic  [ ] Hypercarbic [ ] Other  [ ] Other organ failure     LABS:                        8.5    15.56 )-----------( 433      ( 23 Sep 2018 11:05 )             27.9   09-23    138  |  103  |  12  ----------------------------<  96  3.2<L>   |  25  |  0.46<L>    Ca    7.8<L>      23 Sep 2018 08:55    TPro  6.2  /  Alb  2.3<L>  /  TBili  0.7  /  DBili  x   /  AST  13  /  ALT  6<L>  /  AlkPhos  101  09-22  PT/INR - ( 22 Sep 2018 12:26 )   PT: 12.6 sec;   INR: 1.15 ratio         PTT - ( 22 Sep 2018 12:26 )  PTT:31.0 sec      RADIOLOGY & ADDITIONAL STUDIES:    PROTEIN CALORIE MALNUTRITION PRESENT: [ x] Yes [ ] No  [ x] PPSV2 < or = to 30% [x ] significant weight loss  [ x] poor nutritional intake [x ] catabolic state [ x] anasarca     Albumin, Serum: 2.3 g/dL (09-22-18 @ 12:26)  Artificial Nutrition [ ]     REFERRALS:   [ ]Chaplaincy  [ ] Hospice  [ ]Child Life  [ ]Social Work  [ ]Case management [ ]Holistic Therapy   Goals of Care Discussion Document:

## 2018-09-23 NOTE — PROGRESS NOTE ADULT - SUBJECTIVE AND OBJECTIVE BOX
- Patient seen and examined.  - In summary, patient is a 78 year old woman who presented with  (22 Sep 2018 19:00)  - Today, patient is without complaints.         *****MEDICATIONS:    MEDICATIONS  (STANDING):  enoxaparin Injectable 50 milliGRAM(s) SubCutaneous two times a day  influenza   Vaccine 0.5 milliLiter(s) IntraMuscular once  pantoprazole  Injectable 40 milliGRAM(s) IV Push daily  piperacillin/tazobactam IVPB. 3.375 Gram(s) IV Intermittent every 8 hours  sodium chloride 0.9%. 1000 milliLiter(s) (80 mL/Hr) IV Continuous <Continuous>    MEDICATIONS  (PRN):  morphine  - Injectable 2 milliGRAM(s) IV Push every 4 hours PRN Moderate Pain (4 - 6)  morphine  - Injectable 4 milliGRAM(s) IV Push every 6 hours PRN Severe Pain (7 - 10)           ***** REVIEW OF SYSTEM:  GEN: no fever, no chills, no pain  RESP: no SOB, no cough, no sputum  CVS: no chest pain, no palpitations, no edema  GI: no abdominal pain, no nausea, no vomiting, no constipation, no diarrhea  : no dysuria, no frequency  NEURO: no headache, no dizziness  PSYCH: no depression, not anxious  Derm : no itching, no rash         ***** VITAL SIGNS:  T(F): 98.3 (18 @ 06:59), Max: 98.6 (18 @ 20:38)  HR: 93 (18 @ 06:59) (82 - 93)  BP: 111/70 (18 @ 06:59) (103/67 - 146/76)  RR: 18 (18 @ 06:59) (16 - 20)  SpO2: 95% (18 @ 06:59) (94% - 100%)  Wt(kg): --  ,   I&O's Summary    22 Sep 2018 07:01  -  23 Sep 2018 07:00  --------------------------------------------------------  IN: 1350 mL / OUT: 400 mL / NET: 950 mL             *****PHYSICAL EXAM:  GEN: A&O X 3 , NAD , comfortable  HEENT: NCAT, EOMI, MMM, no icterus  NECK: Supple, No JVD  CVS: S1S2 , regular , No M/R/G appreciated  PULM: CTA B/L,  no W/R/R appreciated  ABD.: soft. non tender, non distended,  bowel sounds present  Extrem: intact pulses , no edema noted  Derm: No rash or ecchymosis noted  PSYCH: normal mood, no depression, not anxious         *****LAB AND IMAGIN.0    16.1  )-----------( 460      ( 22 Sep 2018 12:26 )             26.0                   138  |  103  |  12  ----------------------------<  96  3.2<L>   |  25  |  0.46<L>    Ca    7.8<L>      23 Sep 2018 08:55    TPro  6.2  /  Alb  2.3<L>  /  TBili  0.7  /  DBili  x   /  AST  13  /  ALT  6<L>  /  AlkPhos  101  -    PT/INR - ( 22 Sep 2018 12:26 )   PT: 12.6 sec;   INR: 1.15 ratio         PTT - ( 22 Sep 2018 12:26 )  PTT:31.0 sec                     [All pertinent recent Imaging/Reports reviewed]         *****A S S E S S M E N T   A N D   P L A N :  78F with  breast ca, lung ca/ovarian ca s/p recent chemo ,s/p DPEG sec to sbo, with sepsis and pulmonary emboli  pt with very poor prognosis   AC if no contraindication  consider palliative care   abx  f/u onc      __________________________  MISAEL Hart D.O.

## 2018-09-24 DIAGNOSIS — Z71.89 OTHER SPECIFIED COUNSELING: ICD-10-CM

## 2018-09-24 DIAGNOSIS — Z51.5 ENCOUNTER FOR PALLIATIVE CARE: ICD-10-CM

## 2018-09-24 DIAGNOSIS — R18.0 MALIGNANT ASCITES: ICD-10-CM

## 2018-09-24 DIAGNOSIS — G89.3 NEOPLASM RELATED PAIN (ACUTE) (CHRONIC): ICD-10-CM

## 2018-09-24 LAB
ANION GAP SERPL CALC-SCNC: 11 MMOL/L — SIGNIFICANT CHANGE UP (ref 5–17)
BUN SERPL-MCNC: 10 MG/DL — SIGNIFICANT CHANGE UP (ref 7–23)
CALCIUM SERPL-MCNC: 7.7 MG/DL — LOW (ref 8.4–10.5)
CHLORIDE SERPL-SCNC: 105 MMOL/L — SIGNIFICANT CHANGE UP (ref 96–108)
CO2 SERPL-SCNC: 25 MMOL/L — SIGNIFICANT CHANGE UP (ref 22–31)
CREAT SERPL-MCNC: 0.43 MG/DL — LOW (ref 0.5–1.3)
GLUCOSE SERPL-MCNC: 83 MG/DL — SIGNIFICANT CHANGE UP (ref 70–99)
HCT VFR BLD CALC: 27.4 % — LOW (ref 34.5–45)
HGB BLD-MCNC: 8.5 G/DL — LOW (ref 11.5–15.5)
MCHC RBC-ENTMCNC: 30.8 PG — SIGNIFICANT CHANGE UP (ref 27–34)
MCHC RBC-ENTMCNC: 31 GM/DL — LOW (ref 32–36)
MCV RBC AUTO: 99.3 FL — SIGNIFICANT CHANGE UP (ref 80–100)
PLATELET # BLD AUTO: 380 K/UL — SIGNIFICANT CHANGE UP (ref 150–400)
POTASSIUM SERPL-MCNC: 3.3 MMOL/L — LOW (ref 3.5–5.3)
POTASSIUM SERPL-SCNC: 3.3 MMOL/L — LOW (ref 3.5–5.3)
RBC # BLD: 2.76 M/UL — LOW (ref 3.8–5.2)
RBC # FLD: 17.3 % — HIGH (ref 10.3–14.5)
SODIUM SERPL-SCNC: 141 MMOL/L — SIGNIFICANT CHANGE UP (ref 135–145)
WBC # BLD: 13.69 K/UL — HIGH (ref 3.8–10.5)
WBC # FLD AUTO: 13.69 K/UL — HIGH (ref 3.8–10.5)

## 2018-09-24 PROCEDURE — 99233 SBSQ HOSP IP/OBS HIGH 50: CPT

## 2018-09-24 RX ORDER — HYDROMORPHONE HYDROCHLORIDE 2 MG/ML
0.5 INJECTION INTRAMUSCULAR; INTRAVENOUS; SUBCUTANEOUS
Qty: 0 | Refills: 0 | Status: DISCONTINUED | OUTPATIENT
Start: 2018-09-24 | End: 2018-09-25

## 2018-09-24 RX ORDER — HYDROMORPHONE HYDROCHLORIDE 2 MG/ML
0.2 INJECTION INTRAMUSCULAR; INTRAVENOUS; SUBCUTANEOUS EVERY 4 HOURS
Qty: 0 | Refills: 0 | Status: DISCONTINUED | OUTPATIENT
Start: 2018-09-24 | End: 2018-09-24

## 2018-09-24 RX ORDER — HEPARIN SODIUM 5000 [USP'U]/ML
5000 INJECTION INTRAVENOUS; SUBCUTANEOUS EVERY 12 HOURS
Qty: 0 | Refills: 0 | Status: DISCONTINUED | OUTPATIENT
Start: 2018-09-24 | End: 2018-09-24

## 2018-09-24 RX ORDER — POTASSIUM CHLORIDE 20 MEQ
40 PACKET (EA) ORAL ONCE
Qty: 0 | Refills: 0 | Status: DISCONTINUED | OUTPATIENT
Start: 2018-09-24 | End: 2018-09-24

## 2018-09-24 RX ORDER — HYDROMORPHONE HYDROCHLORIDE 2 MG/ML
0.5 INJECTION INTRAMUSCULAR; INTRAVENOUS; SUBCUTANEOUS EVERY 6 HOURS
Qty: 0 | Refills: 0 | Status: DISCONTINUED | OUTPATIENT
Start: 2018-09-24 | End: 2018-09-24

## 2018-09-24 RX ORDER — POTASSIUM CHLORIDE 20 MEQ
10 PACKET (EA) ORAL
Qty: 0 | Refills: 0 | Status: DISCONTINUED | OUTPATIENT
Start: 2018-09-24 | End: 2018-09-24

## 2018-09-24 RX ADMIN — HYDROMORPHONE HYDROCHLORIDE 0.5 MILLIGRAM(S): 2 INJECTION INTRAMUSCULAR; INTRAVENOUS; SUBCUTANEOUS at 09:26

## 2018-09-24 RX ADMIN — HYDROMORPHONE HYDROCHLORIDE 0.5 MILLIGRAM(S): 2 INJECTION INTRAMUSCULAR; INTRAVENOUS; SUBCUTANEOUS at 09:40

## 2018-09-24 RX ADMIN — PANTOPRAZOLE SODIUM 40 MILLIGRAM(S): 20 TABLET, DELAYED RELEASE ORAL at 14:22

## 2018-09-24 RX ADMIN — HYDROMORPHONE HYDROCHLORIDE 0.5 MILLIGRAM(S): 2 INJECTION INTRAMUSCULAR; INTRAVENOUS; SUBCUTANEOUS at 20:25

## 2018-09-24 RX ADMIN — MORPHINE SULFATE 2 MILLIGRAM(S): 50 CAPSULE, EXTENDED RELEASE ORAL at 04:45

## 2018-09-24 RX ADMIN — HYDROMORPHONE HYDROCHLORIDE 0.5 MILLIGRAM(S): 2 INJECTION INTRAMUSCULAR; INTRAVENOUS; SUBCUTANEOUS at 23:40

## 2018-09-24 RX ADMIN — HYDROMORPHONE HYDROCHLORIDE 0.5 MILLIGRAM(S): 2 INJECTION INTRAMUSCULAR; INTRAVENOUS; SUBCUTANEOUS at 16:40

## 2018-09-24 RX ADMIN — Medication 100 MILLIEQUIVALENT(S): at 13:12

## 2018-09-24 RX ADMIN — MORPHINE SULFATE 2 MILLIGRAM(S): 50 CAPSULE, EXTENDED RELEASE ORAL at 04:08

## 2018-09-24 RX ADMIN — PIPERACILLIN AND TAZOBACTAM 25 GRAM(S): 4; .5 INJECTION, POWDER, LYOPHILIZED, FOR SOLUTION INTRAVENOUS at 05:03

## 2018-09-24 RX ADMIN — HYDROMORPHONE HYDROCHLORIDE 0.5 MILLIGRAM(S): 2 INJECTION INTRAMUSCULAR; INTRAVENOUS; SUBCUTANEOUS at 16:32

## 2018-09-24 RX ADMIN — ENOXAPARIN SODIUM 50 MILLIGRAM(S): 100 INJECTION SUBCUTANEOUS at 05:03

## 2018-09-24 RX ADMIN — HYDROMORPHONE HYDROCHLORIDE 0.5 MILLIGRAM(S): 2 INJECTION INTRAMUSCULAR; INTRAVENOUS; SUBCUTANEOUS at 20:00

## 2018-09-24 RX ADMIN — PIPERACILLIN AND TAZOBACTAM 25 GRAM(S): 4; .5 INJECTION, POWDER, LYOPHILIZED, FOR SOLUTION INTRAVENOUS at 21:51

## 2018-09-24 RX ADMIN — PIPERACILLIN AND TAZOBACTAM 25 GRAM(S): 4; .5 INJECTION, POWDER, LYOPHILIZED, FOR SOLUTION INTRAVENOUS at 14:18

## 2018-09-24 RX ADMIN — HYDROMORPHONE HYDROCHLORIDE 0.5 MILLIGRAM(S): 2 INJECTION INTRAMUSCULAR; INTRAVENOUS; SUBCUTANEOUS at 23:10

## 2018-09-24 NOTE — PROGRESS NOTE ADULT - SUBJECTIVE AND OBJECTIVE BOX
CC: f/u for peritonitis    Patient reports  her abdomen hurts  REVIEW OF SYSTEMS:  All other review of systems negative (Comprehensive ROS)    Antimicrobials Day #  :3  piperacillin/tazobactam IVPB. 3.375 Gram(s) IV Intermittent every 8 hours    Other Medications Reviewed    T(F): 98.2 (09-24-18 @ 08:19), Max: 98.2 (09-24-18 @ 08:19)  HR: 90 (09-24-18 @ 08:19)  BP: 135/77 (09-24-18 @ 08:19)  RR: 18 (09-24-18 @ 08:19)  SpO2: 93% (09-24-18 @ 08:19)  Wt(kg): --    PHYSICAL EXAM:  General: alert, no acute distress  Eyes:  anicteric, no conjunctival injection, no discharge  Oropharynx: no lesions or injection 	  Neck: supple, without adenopathy  Lungs: clear to auscultation  Heart: regular rate and rhythm; no murmur, rubs or gallops  Abdomen: firm, fecalent drainage from venting peg, peritoneal tube capped distended, leaking of malodorous fluid from around both tubes  Skin: no lesions  Extremities: no clubbing, cyanosis, or edema  Neurologic: alert, moves all extremities    LAB RESULTS:                        8.5    13.69 )-----------( 380      ( 24 Sep 2018 09:21 )             27.4     09-24    141  |  105  |  10  ----------------------------<  83  3.3<L>   |  25  |  0.43<L>    Ca    7.7<L>      24 Sep 2018 07:04          MICROBIOLOGY:  RECENT CULTURES:  09-22 @ 15:38 .Blood Blood     No growth to date.          RADIOLOGY REVIEWED:    < from: CT Abdomen and Pelvis w/ IV Cont (09.22.18 @ 14:59) >  IMPRESSION:     Fluid and air including air-fluid level is seen throughout the peritoneal   cavity with thickened peritoneal enhancement consistent with significant   peritonitis.   Resultant edematous thickening of the small bowel and transverse colon   within the peritoneal cavity consistent with secondary enteritis/colitis.    Focal intraperitoneal air is noted adjacent to the mid to distal stomach   of uncertain significance as air is seen throughout the intraperitoneal   cavity. If a stomach defect is a clinical concern, recommend further   evaluation.     Cannot entirely exclude a small filling defect within a branch right   lower lobe pulmonary artery on the first image of this study. Recommend   CT examination of the CT angiography of the chest further evaluation.    Recommend lower extremity venous Doppler examination to evaluate the   veins of the left lower extremity.    Findings were discussed with [place] Mireles by Dr. Ruiz on 9/22/2018 at   6:38 PM with readback.          < end of copied text >            Assessment:  Patient with ovarian ca, ascites tube, venting peg, recent sbo now with airfluid levels and peritonitis with enterocolitis on ct with fecalent drainage from peg and very malodorous fluid from around peg and ascites tube. Presume she has a perforation with ongoing obstruction and antibiotics will have no long term impact on this situation which is not really compatible with life, hospice most appropriate as I don't suspect surgery would be survived in any meaningful way.   Plan: for now will continue zosyn  agree with hospice evaluation

## 2018-09-24 NOTE — PROGRESS NOTE ADULT - PROBLEM SELECTOR PLAN 2
Leaking Venting peg giving family and patient a great deal of distress.  Patient states in native [place] that she is very self conscious of the maloderous fluid leaking out.   Will write provider to RN for frequent dressing changes

## 2018-09-24 NOTE — CONSULT NOTE ADULT - ATTENDING COMMENTS
Overall guarded prognosis.  Patient is not a surgical candidate per surgery notes.  Message left with Dr. Ayo Sanchez.  Family refusing transfer back to [facility].    Jermain Handley MD, FACG, [facility]  [phone] Overall guarded prognosis.  Patient is not a surgical candidate per surgery notes.  Message left with Dr. Ayo Sanchez.  I told daughter at bedside about overall poor prognosis, but the daughter does not seem to grasp the gravity of the situation.    Addendum:  I spoke with attending oncologist at [facility] at about 3 pm today.  [phone].  Attending at [facility] said she will contact the [facility] center, and given that the patient had recent procedures done at [facility] and now has peritonitis, they are willing to accept the patient back at [facility].  Message left with daughter at [phone].    Jermain Handley MD, FACG, [facility]  [phone]

## 2018-09-24 NOTE — DIETITIAN INITIAL EVALUATION ADULT. - ENERGY NEEDS
Pt seen for nutrition consult for BMI <18. Pt with PMH including breast cancer (2006), lung cancer (2016) and ovarian cancer (2017) previously actively receiving chemotherapy, pt admitted to outside hospital for small bowel obstruction S/P venting PEG and drain for malignant ascites. Pt now admitted with abdominal pain, purulent drainage-per surgery no surgical intervention, GI following  pt noted to no longer be a candidate for disease modifying treatment, palliative following, ? hospice evaluation.     Ht:5'3" , Wt: 101.4 lbs, BMI: 18 kg/m2, IBW: 115 lbs +/- 10%, %IBW: 88%  No edema, per nursing flowsheets no pressure injury documented

## 2018-09-24 NOTE — PROGRESS NOTE ADULT - SUBJECTIVE AND OBJECTIVE BOX
SUBJECTIVE AND OBJECTIVE:  INTERVAL HPI/OVERNIGHT EVENTS:  C/O  sharp abdominal pain and C/O malodorous fluid from leaking venting peg    DNR on chart: Yes  Yes    Allergies    Allergy Status Unknown  No Known Allergies    Intolerances    MEDICATIONS  (STANDING):  pantoprazole  Injectable 40 milliGRAM(s) IV Push daily  piperacillin/tazobactam IVPB. 3.375 Gram(s) IV Intermittent every 8 hours    MEDICATIONS  (PRN):  HYDROmorphone  Injectable 0.5 milliGRAM(s) IV Push every 3 hours PRN dyspnea  HYDROmorphone  Injectable 0.5 milliGRAM(s) IV Push every 3 hours PRN mild mod severe pain  LORazepam   Injectable 0.5 milliGRAM(s) IV Push every 6 hours PRN agitation/anxiety      ITEMS UNCHECKED ARE NOT PRESENT    PRESENT SYMPTOMS: [ ]Unable to obtain due to poor mentation   Source if other than patient:  [ ]Family   [ ]Team     Pain (Impact on QOL):    Location:  abdomen  Minimal acceptable level (0-10 scale):          Aggravating factors: movement   Quality:sharp  Radiation: back  Severity (0-10 scale):  8  Timing:    Dyspnea:                           [ ]Mild [ ]Moderate [ ]Severe  Anxiety:                             [x]Mild [ ]Moderate [ ]Severe  Fatigue:                             [ ]Mild x ]Moderate [ ]Severe  Nausea:                             [x]Mild [ ]Moderate [ ]Severe  Loss of appetite:              [ ]Mild [x]Moderate [ ]Severe  Constipation:                    []Mild [x]Moderate [ ]Severe    PAIN AD Score:	  [email or web address].[email or web address]/painad.pdf (Ctrl + left click to view)    Other Symptoms:  x ]All other review of systems negative     Karnofsky Performance Score/Palliative Performance Status Version 2:    30   %    http://palliative.info/resource_material/PPSv2.pdf  PHYSICAL EXAM:  Vital Signs Last 24 Hrs  T(C): 36.8 (24 Sep 2018 08:19), Max: 36.8 (24 Sep 2018 08:19)  T(F): 98.2 (24 Sep 2018 08:19), Max: 98.2 (24 Sep 2018 08:19)  HR: 90 (24 Sep 2018 08:19) (89 - 92)  BP: 135/77 (24 Sep 2018 08:19) (118/70 - 156/57)  BP(mean): --  RR: 18 (24 Sep 2018 08:19) (18 - 18)  SpO2: 93% (24 Sep 2018 08:19) (93% - 94%) I&O's Summary    23 Sep 2018 07:01  -  24 Sep 2018 07:00  --------------------------------------------------------  IN: 0 mL / OUT: 600 mL / NET: -600 mL     GENERAL:  [x]Alert  [x]Oriented x 4 [ ]Lethargic  [x]Cachexia  [ ]Unarousable  x ]Verbal  [ ]Non-Verbal    Behavioral:   [x] Anxiety  [ ] Delirium [ ] Agitation [ ] Other    HEENT:  [ ]Normal   [ xDry mouth   [ ]ET Tube/Trach  [ ]Oral lesions    PULMONARY:   [ ]Clear [ xTachypnea  [ ]Audible excessive secretions   [ ]Rhonchi        [ ]Right [ ]Left [ ]Bilateral  [ ]Crackles        [ ]Right [ ]Left [ ]Bilateral  [ ]Wheezing     [ ]Right [ ]Left [ ]Bilateral    CARDIOVASCULAR:    [ ]Regular [ ]Irregular [ xTachy  [ ]Bull [ ]Murmur [ ]Other    GASTROINTESTINAL:  [ ]Soft  [x]Distended   [ ]+BS  [ ]Non tender [ xTender  [x] Venting PEG   Last BM:    GENITOURINARY:  [ ]Normal [ x Incontinent   [ ]Oliguria/Anuria   [ ]Das    MUSCULOSKELETAL:   [ ]Normal   [x]Weakness  [ ]Bed/Wheelchair bound [ ]Edema    NEUROLOGIC:   [xNo focal deficits  [ ] Cognitive impairment  [ ] Dysphagia [ ]Dysarthria [ ] Paresis [ ]Other     SKIN:   [x]Normal   [ ]Pressure ulcer(s)  [ ]Rash    CRITICAL CARE:  [ ] Shock Present  [ ]Septic [ ]Cardiogenic [ ]Neurologic [ ]Hypovolemic  [ ]  Vasopressors [ ]  Inotropes   [ ] Respiratory failure present  [ ] Acute  [ ] Chronic [ ] Hypoxic  [ ] Hypercarbic [ ] Other  [ ] Other organ failure     LABS:                        8.5    13.69 )-----------( 380      ( 24 Sep 2018 09:21 )             27.4   09-24    141  |  105  |  10  ----------------------------<  83  3.3<L>   |  25  |  0.43<L>    Ca    7.7<L>      24 Sep 2018 07:04          RADIOLOGY & ADDITIONAL STUDIES:    COMPARISON: Chest CT from January 22, 2018    PROCEDURE:   CT Angiography of the Chest.  90 ml of Omnipaque 350 was injected intravenously. 10 ml were discarded.  Sagittal and coronal reformats were performed as well as 3D (MIP)   reconstructions.    FINDINGS:    CHEST:     LUNGS AND LARGE AIRWAYS: Patent central airways. Patient is status post   left lower lobectomy. Right upper and right middle lobe opacities   unchanged. Mild ground glass opacities in the right lower lobe, likely of   infectious etiology  PLEURA: Bilateral pleural effusions, right greater than left. Multiple   calcified pleural plaques.  VESSELS: No pulmonary embolus. Thoracic aortic and coronary   atherosclerosis.  HEART: Heart size is normal. No pericardial effusion.  MEDIASTINUM AND DONOVAN: Few calcified mediastinal lymph nodes.  CHEST WALL AND LOWER NECK: Right Mediport with tip in the SVC.  VISUALIZED UPPER ABDOMEN: Gas and fluid collection in the abdomen is   partially imaged.  BONES: Within normal limits.    IMPRESSION:     No pulmonary embolus.  Small pleural effusions, right greater than left.  Right lower lobe mild groundglass opacities, likely of infectious   etiology.  Stable nodular opacities in the right upper lobe and right middle lobe,   possibly scarring for which continued follow-up is suggested.  Gas and fluid collection within the abdomen is partially imaged.          Protein Calorie Malnutrition Present: [ ] yes [ ] no  [ ] PPSV2 < or = 30%  [ ] significant weight loss [ x poor nutritional intake [ ] anasarca [ ] catabolic state Albumin, Serum: 2.3 g/dL (09-22-18 @ 12:26)  Artificial Nutrition [ ]     REFERRALS:   [ ]Chaplaincy  [ x Hospice  [ ]Child Life  [x]Social Work  [ ]Case management [ ]Holistic Therapy   Goals of Care Document:

## 2018-09-24 NOTE — GOALS OF CARE CONVERSATION - PERSONAL ADVANCE DIRECTIVE - CONVERSATION DETAILS
Met with pt's daughters/HCPs and spouse and with pt who deferred decision making to her daughters.  Consents for hospice care signed.  Pt's family agrees to inpt hosp[facility] care at [facility].  Will make arrangements for pt's admission to inpt level of hospice care at [facility] in a.m. 9/25 pending bed availability.
Met with patients  Mr Ling who defers all decisions to his daughters, Roger and Kalen.   They all agree that a full comfort approach  is most consistent with patient and family's known wishes .    Family wish to complete the antibiotic despite the fact that it will not change the overall poor prognosis.    Daughter , Roger having difficulty with accepting this life limiting disease, and the imminent death of her mother. She persistently asked if there will be surgery or other interventions .   Ms. Duran finally began to cry appropriately verbally stating that she knows her mother is dying.  What matters most to this family is that the maloderous drainage be kept clean and that she be free of pain.    Hospice contacted to evaluate for inpatient transfer.  Comfort care includes but not limited to:  completion of antibiotics but no further w/u if fever spikes,  no blood draws, no iv fluids, no artificial nutrition, only pleasure feeds, maintain  DNR/DNI.

## 2018-09-24 NOTE — PROGRESS NOTE ADULT - SUBJECTIVE AND OBJECTIVE BOX
in bed,  has  abd pain  REVIEW OF SYSTEMS:  GEN: no fever,    no chills  RESP: no SOB,   no cough  CVS: no chest pain,   no palpitations  GI: no abdominal pain,   no nausea,   no vomiting,   no constipation,   no diarrhea  : no dysuria,   no frequency  NEURO: no headache,   no dizziness  PSYCH: no depression,   not anxious  Derm : no rash    MEDICATIONS  (STANDING):  heparin  Injectable 5000 Unit(s) SubCutaneous every 12 hours  influenza   Vaccine 0.5 milliLiter(s) IntraMuscular once  pantoprazole  Injectable 40 milliGRAM(s) IV Push daily  piperacillin/tazobactam IVPB. 3.375 Gram(s) IV Intermittent every 8 hours  potassium chloride   Solution 40 milliEquivalent(s) Oral once  sodium chloride 0.9%. 1000 milliLiter(s) (80 mL/Hr) IV Continuous <Continuous>    MEDICATIONS  (PRN):  HYDROmorphone  Injectable 0.5 milliGRAM(s) IV Push every 6 hours PRN Severe Pain (7 - 10)  HYDROmorphone  Injectable 0.2 milliGRAM(s) IV Push every 4 hours PRN Moderate Pain (4 - 6)      Vital Signs Last 24 Hrs  T(C): 36.8 (24 Sep 2018 08:19), Max: 36.8 (24 Sep 2018 08:19)  T(F): 98.2 (24 Sep 2018 08:19), Max: 98.2 (24 Sep 2018 08:19)  HR: 90 (24 Sep 2018 08:19) (89 - 92)  BP: 135/77 (24 Sep 2018 08:19) (118/70 - 156/57)  BP(mean): --  RR: 18 (24 Sep 2018 08:19) (18 - 18)  SpO2: 93% (24 Sep 2018 08:19) (93% - 94%)  CAPILLARY BLOOD GLUCOSE        I&O's Summary    23 Sep 2018 07:01  -  24 Sep 2018 07:00  --------------------------------------------------------  IN: 0 mL / OUT: 600 mL / NET: -600 mL        PHYSICAL EXAM:  HEAD:  Atraumatic, Normocephalic  NECK: Supple, No   JVD  CHEST/LUNG:   no     rales,     no,    rhonchi  HEART: Regular rate and rhythm;         murmur  ABDOMEN: Soft, Nontender, ;. less distended   peg/ venting and tankoff  catheter   EXTREMITIES:    mild    edema  NEUROLOGY:  alert    LABS:                        8.5    15.56 )-----------( 433      ( 23 Sep 2018 11:05 )             27.9     09-24    141  |  105  |  10  ----------------------------<  83  3.3<L>   |  25  |  0.43<L>    Ca    7.7<L>      24 Sep 2018 07:04    TPro  6.2  /  Alb  2.3<L>  /  TBili  0.7  /  DBili  x   /  AST  13  /  ALT  6<L>  /  AlkPhos  101  09-22    PT/INR - ( 22 Sep 2018 12:26 )   PT: 12.6 sec;   INR: 1.15 ratio         PTT - ( 22 Sep 2018 12:26 )  PTT:31.0 sec            09-22 @ 12:26  3.7  29              Consultant(s) Notes Reviewed:      Care Discussed with Consultants/Other Providers:

## 2018-09-24 NOTE — PROGRESS NOTE ADULT - SUBJECTIVE AND OBJECTIVE BOX
- Patient seen and examined.  - In summary, patient is a 78 year old woman who presented with  (22 Sep 2018 19:00)  - Today, patient is without complaints.         *****MEDICATIONS:    MEDICATIONS  (STANDING):  heparin  Injectable 5000 Unit(s) SubCutaneous every 12 hours  influenza   Vaccine 0.5 milliLiter(s) IntraMuscular once  pantoprazole  Injectable 40 milliGRAM(s) IV Push daily  piperacillin/tazobactam IVPB. 3.375 Gram(s) IV Intermittent every 8 hours  potassium chloride   Solution 40 milliEquivalent(s) Oral once  sodium chloride 0.9%. 1000 milliLiter(s) (80 mL/Hr) IV Continuous <Continuous>    MEDICATIONS  (PRN):  HYDROmorphone  Injectable 0.5 milliGRAM(s) IV Push every 6 hours PRN Severe Pain (7 - 10)  HYDROmorphone  Injectable 0.2 milliGRAM(s) IV Push every 4 hours PRN Moderate Pain (4 - 6)             ***** REVIEW OF SYSTEM:  GEN: no fever, no chills, no pain  RESP: no SOB, no cough, no sputum  CVS: no chest pain, no palpitations, no edema  GI: no abdominal pain, no nausea, no vomiting, no constipation, no diarrhea  : no dysuria, no frequency  NEURO: no headache, no dizziness  PSYCH: no depression, not anxious  Derm : no itching, no rash         ***** VITAL SIGNS:    T(F): 98.2 (18 @ 08:19), Max: 98.2 (18 @ 08:19)  HR: 90 (18 @ 08:19) (89 - 92)  BP: 135/77 (18 @ 08:19) (118/70 - 156/57)  RR: 18 (18 @ 08:19) (18 - 18)  SpO2: 93% (18 @ 08:19) (93% - 94%)  Wt(kg): --  ,   I&O's Summary    23 Sep 2018 07:01  -  24 Sep 2018 07:00  --------------------------------------------------------  IN: 0 mL / OUT: 600 mL / NET: -600 mL                 *****PHYSICAL EXAM:  GEN: A&O X 3 , NAD , comfortable  HEENT: NCAT, EOMI, MMM, no icterus  NECK: Supple, No JVD  CVS: S1S2 , regular , No M/R/G appreciated  PULM: CTA B/L,  no W/R/R appreciated  ABD.: soft. non tender, non distended,  bowel sounds present  Extrem: intact pulses , no edema noted  Derm: No rash or ecchymosis noted  PSYCH: normal mood, no depression, not anxious         *****LAB AND IMAGIN.5    13.69 )-----------( 380      ( 24 Sep 2018 09:21 )             27.4                   141  |  105  |  10  ----------------------------<  83  3.3<L>   |  25  |  0.43<L>    Ca    7.7<L>      24 Sep 2018 07:04    TPro  6.2  /  Alb  2.3<L>  /  TBili  0.7  /  DBili  x   /  AST  13  /  ALT  6<L>  /  AlkPhos  101      PT/INR - ( 22 Sep 2018 12:26 )   PT: 12.6 sec;   INR: 1.15 ratio         PTT - ( 22 Sep 2018 12:26 )  PTT:31.0 sec                   [All pertinent recent Imaging/Reports reviewed]         *****A S S E S S M E N T   A N D   P L A N :  78F with  breast ca, lung ca/ovarian ca s/p recent chemo ,s/p DPEG sec to sbo, with sepsis and pulmonary emboli  pt with very poor prognosis   AC if no contraindication  palliative care noted  abx  f/u onc  no interventions planned  await hospice  will sign off, recall if needed    __________________________  MISAEL Hart D.O.

## 2018-09-24 NOTE — DIETITIAN INITIAL EVALUATION ADULT. - PHYSICAL APPEARANCE
Nutrition focused physical exam deferred at this time, pt resting comfortably in bed, per daughter pt experiences discomfort when position is changed, visually pt appears to have muscle mass depletion to temple

## 2018-09-24 NOTE — CONSULT NOTE ADULT - CONSULT REQUESTED DATE/TIME
22-Sep-2018
22-Sep-2018 19:15
22-Sep-2018 21:44
23-Sep-2018 14:00
23-Sep-2018 14:15
24-Sep-2018 09:29

## 2018-09-24 NOTE — CHART NOTE - NSCHARTNOTEFT_GEN_A_CORE
Upon Nutritional Assessment by the Registered Dietitian your patient was determined to meet criteria / has evidence of the following diagnosis/diagnoses:          [ ]  Mild Protein Calorie Malnutrition        [x ]  Moderate Protein Calorie Malnutrition        [ ] Severe Protein Calorie Malnutrition        [ ] Unspecified Protein Calorie Malnutrition        [ ] Underweight / BMI <19        [ ] Morbid Obesity / BMI > 40      Findings as based on:  [x ] Comprehensive nutrition assessment: Pt meeting <75% estimated needs x >1 week, ~4% wt loss within 3 weeks  [ ] Nutrition Focused Physical Exam  [ ] Other:       Nutrition Plan/Recommendations:  Obtain/honor food preferences as requested, will provide ice cream once daily and health shake 2 x daily (200 Kcal, 6g protein per 4 oz serving)        PROVIDER Section:     By signing this assessment you are acknowledging and agree with the diagnosis/diagnoses assigned by the Registered Dietitian    Comments:

## 2018-09-24 NOTE — PROGRESS NOTE ADULT - PROBLEM SELECTOR PLAN 3
Started Dilaudid .5  q3 hours prn dyspnea and pain.   Low tolerance for ATC dilaudid if pain management not adequate

## 2018-09-24 NOTE — PROGRESS NOTE ADULT - ASSESSMENT
77F Mandarin speaking   PMH  hep B,  breast CA  , lung CA (s/p lobectomy), ovarian CA .  asthma,     admitted  with fevers.  foul smelling drainage  from abdomen    at [facility]-  s/p  chemo, and family , was  told,  pt  needs  hospice/ no further intervention  seen by oncology  here  surg eval, no intervention, given poor  prognosis/ on  zosyn  has venting  peg and tankoff catheter  ct chest, no pe    prognosis  very poor/ideal  for  hospice/ pt is  dnr / palliative care  swallow  eval/ on pleasure feeds at  daughters request  on dialaudid,   for pain  ct  abdomen, noted  spoke with  daughter       < from: CT Abdomen and Pelvis w/ IV Cont (09.22.18 @ 14:59) >  IMPRESSION:     Fluid and air including air-fluid level is seen throughout the peritoneal   cavity with thickened peritoneal enhancement consistent with significant   peritonitis.   Resultant edematous thickening of the small bowel and transverse colon   within the peritoneal cavity consistent with secondary enteritis/colitis.  Focal intraperitoneal air is noted adjacent to the mid to distal stomach   of uncertain significance as air is seen throughout the intraperitoneal   cavity. If a stomach defect is a clinical concern, recommend further   evaluation.   Cannot entirely exclude a small filling defect within a branch right   lower lobe pulmonary artery on the first image of this study. Recommend   CT examination of the CT angiography of the chest further evaluation.  Recommend lower extremity venous Doppler examination to evaluate the   veins of the left lower extremity.  Findings were discussed with [place] Mireles by Dr. Ruiz on 9/22/2018 at   6:38 PM with readback.    < end of copied text > 77F Mandarin speaking   PMH  hep B,  breast CA  , lung CA (s/p lobectomy), ovarian CA .  asthma,     admitted  with fevers.  foul smelling drainage  from abdomen    at [facility]-  s/p  chemo, and family , was  told,  pt  needs  hospice/ no further intervention  seen by oncology  here  surg eval, no intervention, given poor  prognosis/ on  zosyn  has venting  peg and tankoff catheter  ct chest, no pe    prognosis  very poor/ideal  for  hospice/ pt is  dnr / palliative care  swallow  eval/ on pleasure feeds at  daughters request  on dialaudid,   for pain  ct  abdomen, noted  spoke with  daughter, wants  catheter  drained,  even though, it  had  no  drainage at  home       < from: CT Abdomen and Pelvis w/ IV Cont (09.22.18 @ 14:59) >  IMPRESSION:     Fluid and air including air-fluid level is seen throughout the peritoneal   cavity with thickened peritoneal enhancement consistent with significant   peritonitis.   Resultant edematous thickening of the small bowel and transverse colon   within the peritoneal cavity consistent with secondary enteritis/colitis.  Focal intraperitoneal air is noted adjacent to the mid to distal stomach   of uncertain significance as air is seen throughout the intraperitoneal   cavity. If a stomach defect is a clinical concern, recommend further   evaluation.   Cannot entirely exclude a small filling defect within a branch right   lower lobe pulmonary artery on the first image of this study. Recommend   CT examination of the CT angiography of the chest further evaluation.  Recommend lower extremity venous Doppler examination to evaluate the   veins of the left lower extremity.  Findings were discussed with [place] Mireles by Dr. Ruiz on 9/22/2018 at   6:38 PM with readback.    < end of copied text >

## 2018-09-24 NOTE — GOALS OF CARE CONVERSATION - PERSONAL ADVANCE DIRECTIVE - TREATMENT GUIDELINES
Comfort measures only/Do not re-hospitalize/No blood draws/DNR Order/No artificial nutrition/No IV fluids

## 2018-09-24 NOTE — DIETITIAN INITIAL EVALUATION ADULT. - NS AS NUTRI INTERV MEALS SNACK
1. Diet texture deferred to medical team and speech pathologist-noted pending swallow evaluation, currently ordered for dysphagia 2 diet with thin liquids, 2. Obtain/honor food preferences as requested-will provide ice cream once daily and health shake 2 x daily (200 Kcal, 6g protein per 4 oz serving), 3. Monitor PO intake, diet tolerance, weight trends, labs, and skin integrity, 4. RD to remain available as needed

## 2018-09-24 NOTE — DIETITIAN INITIAL EVALUATION ADULT. - OTHER INFO
Per daughter, pt with history of gradual weight loss since initial cancer diagnosis in 2016 from  lbs. Recently daughter stated pt's weight has been steady ~106 lbs. Daughter unsure of recent weight due to fluid shifts related to malignant ascites, noted current admission weight 101.4 lbs indicating further ~5 lb wt loss. Pt with no food allergies per daughter, pt takes hemp oil and astragalus supplement recommended by integrative medicine physician. No N+V at this time-per daughter pt with venting PEG output, no BM documented thus far Per daughter, pt with history of gradual weight loss since initial cancer diagnosis in 2016 from  lbs. Recently daughter stated pt's weight has been steady ~106 lbs. Daughter unsure of recent weight due to fluid shifts related to malignant ascites, noted current admission weight 101.4 lbs indicating further ~5 lb wt loss. Pt with no food allergies per daughter, pt takes hemp oil and astragalus supplement recommended by integrative medicine physician. No N+V at this time-per daughter pt with venting PEG output-300mL 9/23, no output documented thus far today, visually pt with output at bedside, no BM documented thus far. Per daughter pt with no chewing/swallowing difficulty-noted pending swallow evaluation. In house pt with ongoing minimal intake, daughter will provide bites and sips of foods for pt's pleasure, pt had bites of cream of wheat this morning, ice cream yesterday. Receptive to health shakes.

## 2018-09-24 NOTE — DIETITIAN INITIAL EVALUATION ADULT. - ORAL INTAKE PTA
Per daughter, pt was eating fairly prior to hospitalization at [facility] 9/4, prior to this pt was consuming several smaller meals throughout the day, while hospitalized daughter stated pt was NPO x 10 days with a low advancement to clears->full->pureed diet on discharge, at home pt with minimal po intake and per daughter anything pt eats will be drained from venting PEG./poor

## 2018-09-24 NOTE — CONSULT NOTE ADULT - CONSULT REASON
Goals of care
Intraabdominal Fluid Collection
ovarian cancer
peritonitis
pulmonary emboli/pericardial calcification
peritonitis, abdominal pain

## 2018-09-24 NOTE — DIETITIAN INITIAL EVALUATION ADULT. - DOB: +DATEOFBIRTH
Render Post-Care Instructions In Note?: no Medical Necessity Information: It is in your best interest to select a reason for this procedure from the list below. All of these items fulfill various CMS LCD requirements except the new and changing color options. Medical Necessity Clause: This procedure was medically necessary because the lesions that were treated were: Detail Level: Detailed Post-Care Instructions: I reviewed with the patient in detail post-care instructions. Patient is to wear sunprotection, and avoid picking at any of the treated lesions. Pt may apply Vaseline to crusted or scabbing areas. Consent: The patient's consent was obtained including but not limited to risks of crusting, scabbing, blistering, scarring, darker or lighter pigmentary change, recurrence, incomplete removal and infection. Duration Of Freeze Thaw-Cycle (Seconds): 0 Statement Selected

## 2018-09-24 NOTE — CONSULT NOTE ADULT - SUBJECTIVE AND OBJECTIVE BOX
Patient is a 78y old  Female who presents with a chief complaint of Malignant ascites (23 Sep 2018 13:45)      HPI:  International Travel? No(1)    	  · HPI Objective Statement: 79 y/o female hx of  hep B,  breast  ca, 2006,   lung CA in 2016,  and met ovarian CA  in 2017 (with peritoneal implants) s/p chemo and surgery at [facility], then had chemo again after recurrence  for ovarian ca,  (most recent treatment 3 weeks ago) . patient was recently admitted to [facility] for  SBO (abdominal pain, not passing BM), managed medically for a week and then underwent venting peg and drain placement for  ascites. Patient was discharged home with drain 2 days prior to admission, drain output has been foul smelling and purulent from PEG site and no output from ascites drain catheter.  patient has also had significant worsening abdominal pain. low grade fever to 99.8. went to oncology yesterday who advised she should get set up with hospice for home care , here with daughters who say she is getting much weaker and don't feel they can safely manage her at home. (22 Sep 2018 16:16)    Per discussion with daughter - [facility] stated no further chemo.  Found to have peritonitis with air fluid levels on CT (peritoneal fluid) on admission.  Evaluated by surgery here -no intervention. Started on Antibiotics per ID; recommend palliative input.  We are asked to evaluate pt as well.  +abdominal pain  G tube - drainage, purulent and gastric output, some drainage at G tube site (decreased since bumper tightened by surgical team as per daughter) +foul smelling, non bloody    PAST MEDICAL & SURGICAL HISTORY:  Pelvic mass in female: diagnosed via PET/CT in 2/2017  Headache  Lung mass  Hyperlipidemia  Hepatitis B  Breast cancer: 2007 Left Mastectomy  Status post lobectomy of lung: Left Low Lobe 2016  History of lumpectomy: L Breast, 2007    Allergies  Allergy Status Unknown  No Known Allergies      MEDICATIONS  (STANDING):  heparin  Injectable 5000 Unit(s) SubCutaneous every 12 hours  influenza   Vaccine 0.5 milliLiter(s) IntraMuscular once  pantoprazole  Injectable 40 milliGRAM(s) IV Push daily  piperacillin/tazobactam IVPB. 3.375 Gram(s) IV Intermittent every 8 hours  potassium chloride   Solution 40 milliEquivalent(s) Oral once  sodium chloride 0.9%. 1000 milliLiter(s) (80 mL/Hr) IV Continuous <Continuous>    MEDICATIONS  (PRN):  HYDROmorphone  Injectable 0.5 milliGRAM(s) IV Push every 6 hours PRN Severe Pain (7 - 10)  HYDROmorphone  Injectable 0.2 milliGRAM(s) IV Push every 4 hours PRN Moderate Pain (4 - 6)      Social History:  lives with family  no known toxic habits    Family History   IBD (  ) Yes   ( X ) No  GI Malignancy (  )  Yes    (X  ) No      Advanced Directives: (     ) None    (  X    ) DNR    (     ) DNI    (     ) Health Care Proxy:     Review of Systems:    General:  +fever +fatigue +generalized weakness  CV:  No pain, palpitations, hypo/hypertension  Resp:  No dyspnea, cough, tachypnea, wheezing  GI:  see HPI  :  No pain, bleeding, incontinence, nocturia  Muscle:  No pain, weakness  Neuro:  No weakness, tingling, memory problems  Psych:  No fatigue, insomnia, mood problems, depression  Endocrine:  No polyuria, polydypsia, cold/heat intolerance  Heme:  No petechiae, ecchymosis, easy bruisability, see HPI  Skin:  No rash, tattoos, scars, edema      Vital Signs Last 24 Hrs  T(C): 36.8 (24 Sep 2018 08:19), Max: 36.8 (24 Sep 2018 08:19)  T(F): 98.2 (24 Sep 2018 08:19), Max: 98.2 (24 Sep 2018 08:19)  HR: 90 (24 Sep 2018 08:19) (89 - 92)  BP: 135/77 (24 Sep 2018 08:19) (118/70 - 156/57)  BP(mean): --  RR: 18 (24 Sep 2018 08:19) (18 - 18)  SpO2: 93% (24 Sep 2018 08:19) (93% - 94%)    PHYSICAL EXAM:    Constitutional: frail appearing  female, non-toxic appearing.  daughter at bedside  Neck: No LAD, supple. no JVD  Respiratory: decreased SB at bases  Cardiovascular: S1 and S2, RRR  Gastrointestinal: softly distended +generalized tenderness to palpation +G tube to drainage - purulent and gastric/enteric contents  +catheter LUQ  Extremities: No peripheral edema, neg clubing, cyanosis  Vascular: 2+ peripheral pulses  Neurological: no focal asymmetry  Skin: No rashes        LABS:                        8.5    15.56 )-----------( 433      ( 23 Sep 2018 11:05 )             27.9     WBC Count: 16.1 K/uL (09.22.18 @ 12:26)  Band Neutrophils %: 14 % (09.22.18 @ 12:26)      09-24    141  |  105  |  10  ----------------------------<  83  3.3<L>   |  25  |  0.43<L>    Ca    7.7<L>      24 Sep 2018 07:04    TPro  6.2  /  Alb  2.3<L>  /  TBili  0.7  /  DBili  x   /  AST  13  /  ALT  6<L>  /  AlkPhos  101  09-22    PT/INR - ( 22 Sep 2018 12:26 )   PT: 12.6 sec;   INR: 1.15 ratio    PTT - ( 22 Sep 2018 12:26 )  PTT:31.0 sec        RADIOLOGY & ADDITIONAL TESTS:  < from: CT Abdomen and Pelvis w/ IV Cont (09.22.18 @ 14:59) >  FINDINGS:    LOWER CHEST: Trace right pleural effusion. Bilateral lower lobe linear   atelectasis. Calcified and noncalcified bilateral pleural plaques.   Calcifications along the pericardium. Cannot exclude a small filling   defect at the bifurcation of what likely represents a right lower lobe   branch pulmonary artery visualized on series 2 image #1.    LIVER: Mild periportal edema.  BILE DUCTS: Normal caliber.  GALLBLADDER: Contracted.  SPLEEN: Within normal limits.  PANCREAS: Within normal limits.  ADRENALS: Within normal limits.  KIDNEYS/URETERS: Within normal limits.    BLADDER: Distended.  REPRODUCTIVE ORGANS: Status post hysterectomy. No adnexal mass.    BOWEL/PERITONEUM: Fluid and air including air-fluid level is seen   throughout the peritoneal cavity with thickened peritoneal enhancement   consistent with peritonitis. There is resultant small bowel and   transverse colon wall edema and enhancement at these bowel loopsare   intraperitoneal.. There is air adjacent to the mid to distal stomach   anteriorly. A gastrostomy tube is visualized within the stomach.   Difficult to assess for peritoneal implants due to decompressed distal   small bowel.  VESSELS:  Atherosclerotic disease. Cannot entirely exclude a partial   filling defects within the left common iliac vein.  RETROPERITONEUM: No lymphadenopathy.    ABDOMINAL WALL: Anasarca. Left lower quadrant abdominal drain is   visualized. Small subcutaneous air adjacent to the PEG tube.  BONES: Mild multilevel degenerative disease. No osseous metastatic   disease.    IMPRESSION:     Fluid and air including air-fluid level is seen throughout the peritoneal   cavity with thickened peritoneal enhancement consistent with significant   peritonitis.   Resultant edematous thickening of the small bowel and transverse colon   within the peritoneal cavity consistent with secondary enteritis/colitis.    Focal intraperitoneal air is noted adjacent to the mid to distal stomach   of uncertain significance as air is seen throughout the intraperitoneal   cavity. If a stomach defect is a clinical concern, recommend further   evaluation.     Cannot entirely exclude a small filling defect within a branch right   lower lobe pulmonary artery on the first image of this study. Recommend   CT examination of the CT angiography of the chest further evaluation.    Recommend lower extremity venous Doppler examination to evaluate the   veins of the left lower extremity.        < from: CT Angio Chest w/ IV Cont (09.23.18 @ 13:56) >  IMPRESSION:     No pulmonary embolus.  Small pleural effusions, right greater than left.  Right lower lobe mild groundglass opacities, likely of infectious   etiology.  Stable nodular opacities in the right upper lobe and right middle lobe,   possibly scarring for which continued follow-up is suggested.  Gas and fluid collection within the abdomen is partially imaged. Patient is a 78y old  Female who presents with a chief complaint of Malignant ascites (23 Sep 2018 13:45)      HPI:  77 y/o female hx of  hep B,  breast  ca, 2006, lung CA in 2016,  and metastatic ovarian CA  in 2017 (with peritoneal implants) s/p chemo and surgery at [facility] [place], [place], then had chemo again after recurrence  for ovarian ca,  (most recent treatment 3 weeks ago) . patient was recently admitted to [facility] [place] [place] for SBO (abdominal pain, not passing BM), managed medically for a week and then had a tunneled peritoneal catheter placed for ascites followed by venting peg. Patient was discharged home with indwelling peritoneal catheter in place 2 days prior to this admission, Purulent from PEG site and no output from ascites drain catheter.  Patient has also had significant worsening abdominal pain. low grade fever to 99.8. went to oncology yesterday who advised she should get set up with hospice for home care , here with daughters who say she is getting much weaker and don't feel they can safely manage her at home. (22 Sep 2018 16:16)    Per discussion with daughter - [facility] [place] [place] stated no further chemo.  Found to have peritonitis with air fluid levels on CT (peritoneal fluid) on admission.  Evaluated by surgery here -no intervention. Started on Antibiotics per ID; recommend palliative input.  We are asked to evaluate pt as well.  +abdominal pain  G tube - drainage, purulent and gastric output, some drainage at G tube site (decreased since bumper tightened by surgical team as per daughter) +foul smelling, non bloody    PAST MEDICAL & SURGICAL HISTORY:  Pelvic mass in female: diagnosed via PET/CT in 2/2017  Headache  Lung mass  Hyperlipidemia  Hepatitis B  Breast cancer: 2007 Left Mastectomy  Status post lobectomy of lung: Left Low Lobe 2016  History of lumpectomy: L Breast, 2007    Allergies  Allergy Status Unknown  No Known Allergies      MEDICATIONS  (STANDING):  heparin  Injectable 5000 Unit(s) SubCutaneous every 12 hours  influenza   Vaccine 0.5 milliLiter(s) IntraMuscular once  pantoprazole  Injectable 40 milliGRAM(s) IV Push daily  piperacillin/tazobactam IVPB. 3.375 Gram(s) IV Intermittent every 8 hours  potassium chloride   Solution 40 milliEquivalent(s) Oral once  sodium chloride 0.9%. 1000 milliLiter(s) (80 mL/Hr) IV Continuous <Continuous>    MEDICATIONS  (PRN):  HYDROmorphone  Injectable 0.5 milliGRAM(s) IV Push every 6 hours PRN Severe Pain (7 - 10)  HYDROmorphone  Injectable 0.2 milliGRAM(s) IV Push every 4 hours PRN Moderate Pain (4 - 6)      Social History:  lives with family  no known toxic habits    Family History   IBD (  ) Yes   ( X ) No  GI Malignancy (  )  Yes    (X  ) No      Advanced Directives: (     ) None    (  X    ) DNR    (     ) DNI    (     ) Health Care Proxy:     Review of Systems:    General:  +fever +fatigue +generalized weakness  CV:  No pain, palpitations, hypo/hypertension  Resp:  No dyspnea, cough, tachypnea, wheezing  GI:  see HPI  :  No pain, bleeding, incontinence, nocturia  Muscle:  No pain, weakness  Neuro:  No weakness, tingling, memory problems  Psych:  No fatigue, insomnia, mood problems, depression  Endocrine:  No polyuria, polydypsia, cold/heat intolerance  Heme:  No petechiae, ecchymosis, easy bruisability, see HPI  Skin:  No rash, tattoos, scars, edema      Vital Signs Last 24 Hrs  T(C): 36.8 (24 Sep 2018 08:19), Max: 36.8 (24 Sep 2018 08:19)  T(F): 98.2 (24 Sep 2018 08:19), Max: 98.2 (24 Sep 2018 08:19)  HR: 90 (24 Sep 2018 08:19) (89 - 92)  BP: 135/77 (24 Sep 2018 08:19) (118/70 - 156/57)  BP(mean): --  RR: 18 (24 Sep 2018 08:19) (18 - 18)  SpO2: 93% (24 Sep 2018 08:19) (93% - 94%)    PHYSICAL EXAM:    Constitutional: frail appearing  female, non-toxic appearing.  daughter at bedside  Neck: No LAD, supple. no JVD  Respiratory: decreased SB at bases  Cardiovascular: S1 and S2, RRR  Gastrointestinal: softly distended +generalized tenderness to palpation +G tube to drainage - purulent and gastric/enteric contents  +catheter LUQ  Extremities: No peripheral edema, neg clubing, cyanosis  Vascular: 2+ peripheral pulses  Neurological: no focal asymmetry  Skin: No rashes        LABS:                        8.5    15.56 )-----------( 433      ( 23 Sep 2018 11:05 )             27.9     WBC Count: 16.1 K/uL (09.22.18 @ 12:26)  Band Neutrophils %: 14 % (09.22.18 @ 12:26)      09-24    141  |  105  |  10  ----------------------------<  83  3.3<L>   |  25  |  0.43<L>    Ca    7.7<L>      24 Sep 2018 07:04    TPro  6.2  /  Alb  2.3<L>  /  TBili  0.7  /  DBili  x   /  AST  13  /  ALT  6<L>  /  AlkPhos  101  09-22    PT/INR - ( 22 Sep 2018 12:26 )   PT: 12.6 sec;   INR: 1.15 ratio    PTT - ( 22 Sep 2018 12:26 )  PTT:31.0 sec        RADIOLOGY & ADDITIONAL TESTS:  < from: CT Abdomen and Pelvis w/ IV Cont (09.22.18 @ 14:59) >  FINDINGS:    LOWER CHEST: Trace right pleural effusion. Bilateral lower lobe linear   atelectasis. Calcified and noncalcified bilateral pleural plaques.   Calcifications along the pericardium. Cannot exclude a small filling   defect at the bifurcation of what likely represents a right lower lobe   branch pulmonary artery visualized on series 2 image #1.    LIVER: Mild periportal edema.  BILE DUCTS: Normal caliber.  GALLBLADDER: Contracted.  SPLEEN: Within normal limits.  PANCREAS: Within normal limits.  ADRENALS: Within normal limits.  KIDNEYS/URETERS: Within normal limits.    BLADDER: Distended.  REPRODUCTIVE ORGANS: Status post hysterectomy. No adnexal mass.    BOWEL/PERITONEUM: Fluid and air including air-fluid level is seen   throughout the peritoneal cavity with thickened peritoneal enhancement   consistent with peritonitis. There is resultant small bowel and   transverse colon wall edema and enhancement at these bowel loopsare   intraperitoneal.. There is air adjacent to the mid to distal stomach   anteriorly. A gastrostomy tube is visualized within the stomach.   Difficult to assess for peritoneal implants due to decompressed distal   small bowel.  VESSELS:  Atherosclerotic disease. Cannot entirely exclude a partial   filling defects within the left common iliac vein.  RETROPERITONEUM: No lymphadenopathy.    ABDOMINAL WALL: Anasarca. Left lower quadrant abdominal drain is   visualized. Small subcutaneous air adjacent to the PEG tube.  BONES: Mild multilevel degenerative disease. No osseous metastatic   disease.    IMPRESSION:     Fluid and air including air-fluid level is seen throughout the peritoneal   cavity with thickened peritoneal enhancement consistent with significant   peritonitis.   Resultant edematous thickening of the small bowel and transverse colon   within the peritoneal cavity consistent with secondary enteritis/colitis.    Focal intraperitoneal air is noted adjacent to the mid to distal stomach   of uncertain significance as air is seen throughout the intraperitoneal   cavity. If a stomach defect is a clinical concern, recommend further   evaluation.     Cannot entirely exclude a small filling defect within a branch right   lower lobe pulmonary artery on the first image of this study. Recommend   CT examination of the CT angiography of the chest further evaluation.    Recommend lower extremity venous Doppler examination to evaluate the   veins of the left lower extremity.        < from: CT Angio Chest w/ IV Cont (09.23.18 @ 13:56) >  IMPRESSION:     No pulmonary embolus.  Small pleural effusions, right greater than left.  Right lower lobe mild groundglass opacities, likely of infectious   etiology.  Stable nodular opacities in the right upper lobe and right middle lobe,   possibly scarring for which continued follow-up is suggested.  Gas and fluid collection within the abdomen is partially imaged.

## 2018-09-25 ENCOUNTER — TRANSCRIPTION ENCOUNTER (OUTPATIENT)
Age: 78
End: 2018-09-25

## 2018-09-25 VITALS — TEMPERATURE: 98 F

## 2018-09-25 PROCEDURE — 82330 ASSAY OF CALCIUM: CPT

## 2018-09-25 PROCEDURE — 85014 HEMATOCRIT: CPT

## 2018-09-25 PROCEDURE — 96375 TX/PRO/DX INJ NEW DRUG ADDON: CPT

## 2018-09-25 PROCEDURE — 96374 THER/PROPH/DIAG INJ IV PUSH: CPT | Mod: XU

## 2018-09-25 PROCEDURE — 84295 ASSAY OF SERUM SODIUM: CPT

## 2018-09-25 PROCEDURE — 85730 THROMBOPLASTIN TIME PARTIAL: CPT

## 2018-09-25 PROCEDURE — 82803 BLOOD GASES ANY COMBINATION: CPT

## 2018-09-25 PROCEDURE — 83605 ASSAY OF LACTIC ACID: CPT

## 2018-09-25 PROCEDURE — 82947 ASSAY GLUCOSE BLOOD QUANT: CPT

## 2018-09-25 PROCEDURE — 82435 ASSAY OF BLOOD CHLORIDE: CPT

## 2018-09-25 PROCEDURE — 71045 X-RAY EXAM CHEST 1 VIEW: CPT

## 2018-09-25 PROCEDURE — 99233 SBSQ HOSP IP/OBS HIGH 50: CPT

## 2018-09-25 PROCEDURE — 74177 CT ABD & PELVIS W/CONTRAST: CPT

## 2018-09-25 PROCEDURE — 80053 COMPREHEN METABOLIC PANEL: CPT

## 2018-09-25 PROCEDURE — 80048 BASIC METABOLIC PNL TOTAL CA: CPT

## 2018-09-25 PROCEDURE — 84132 ASSAY OF SERUM POTASSIUM: CPT

## 2018-09-25 PROCEDURE — 96361 HYDRATE IV INFUSION ADD-ON: CPT

## 2018-09-25 PROCEDURE — 99285 EMERGENCY DEPT VISIT HI MDM: CPT | Mod: 25

## 2018-09-25 PROCEDURE — 93005 ELECTROCARDIOGRAM TRACING: CPT

## 2018-09-25 PROCEDURE — 87040 BLOOD CULTURE FOR BACTERIA: CPT

## 2018-09-25 PROCEDURE — 85027 COMPLETE CBC AUTOMATED: CPT

## 2018-09-25 PROCEDURE — 85610 PROTHROMBIN TIME: CPT

## 2018-09-25 PROCEDURE — 71275 CT ANGIOGRAPHY CHEST: CPT

## 2018-09-25 RX ORDER — HEPARIN SODIUM 5000 [USP'U]/ML
5000 INJECTION INTRAVENOUS; SUBCUTANEOUS EVERY 12 HOURS
Qty: 0 | Refills: 0 | Status: DISCONTINUED | OUTPATIENT
Start: 2018-09-25 | End: 2018-09-25

## 2018-09-25 RX ORDER — PANTOPRAZOLE SODIUM 20 MG/1
40 TABLET, DELAYED RELEASE ORAL
Qty: 0 | Refills: 0 | COMMUNITY

## 2018-09-25 RX ORDER — HYDROMORPHONE HYDROCHLORIDE 2 MG/ML
0.5 INJECTION INTRAMUSCULAR; INTRAVENOUS; SUBCUTANEOUS
Qty: 0 | Refills: 0 | COMMUNITY
Start: 2018-09-25

## 2018-09-25 RX ORDER — PANTOPRAZOLE SODIUM 20 MG/1
40 TABLET, DELAYED RELEASE ORAL DAILY
Qty: 0 | Refills: 0 | Status: DISCONTINUED | OUTPATIENT
Start: 2018-09-25 | End: 2018-09-25

## 2018-09-25 RX ADMIN — HYDROMORPHONE HYDROCHLORIDE 0.5 MILLIGRAM(S): 2 INJECTION INTRAMUSCULAR; INTRAVENOUS; SUBCUTANEOUS at 15:41

## 2018-09-25 RX ADMIN — HYDROMORPHONE HYDROCHLORIDE 0.5 MILLIGRAM(S): 2 INJECTION INTRAMUSCULAR; INTRAVENOUS; SUBCUTANEOUS at 03:07

## 2018-09-25 RX ADMIN — HYDROMORPHONE HYDROCHLORIDE 0.5 MILLIGRAM(S): 2 INJECTION INTRAMUSCULAR; INTRAVENOUS; SUBCUTANEOUS at 03:35

## 2018-09-25 RX ADMIN — PIPERACILLIN AND TAZOBACTAM 25 GRAM(S): 4; .5 INJECTION, POWDER, LYOPHILIZED, FOR SOLUTION INTRAVENOUS at 05:56

## 2018-09-25 RX ADMIN — HYDROMORPHONE HYDROCHLORIDE 0.5 MILLIGRAM(S): 2 INJECTION INTRAMUSCULAR; INTRAVENOUS; SUBCUTANEOUS at 13:05

## 2018-09-25 RX ADMIN — HYDROMORPHONE HYDROCHLORIDE 0.5 MILLIGRAM(S): 2 INJECTION INTRAMUSCULAR; INTRAVENOUS; SUBCUTANEOUS at 06:43

## 2018-09-25 NOTE — PROGRESS NOTE ADULT - PROBLEM SELECTOR PLAN 1
No further disease modifying therapies being offered.   Patient scheduled for transfer to [facility]

## 2018-09-25 NOTE — DISCHARGE NOTE ADULT - PATIENT PORTAL LINK FT
You can access the [facility] Portal, offered by [facility], by registering with the following website: [email or web address]

## 2018-09-25 NOTE — PROGRESS NOTE ADULT - ASSESSMENT
77 y/o woman with history of hep B, breast cancer (2006), lung cancer in 2016, and ovarian cancer (2017) s/p debulking procedure and chemotherapy (all oncology care at [facility]) who was last admitted at [facility] from 9/4 - 9/19 for a malignant small bowel obstruction. S/p placement of a tunneled peritoneal catheter for ascites followed by a decompressive palliative G-tube at [facility]. Presents to the ED for drainage around the g-tube. CT showing air and fluid throughout the peritoneal cavity with focal air adjacent to the distal stomach.  Now with peritonitis.  The patient is in a life threatening situation.  Overall guarded progress.  Felt by surgical team not be a surgical candidate.  Peritonitis could be related to indwelling peritoneal catheter or due to leakage into the peritoneal space at the PEG ostomy site.    PLAN  Abx per ID  Surg/Onc input noted - no planned intervention and the external bumper was tightened by surgery.  Palliative approach is appropriate; plans in place at this time for inpt hospice  G tube to drainage  Pain management    Overall prognosis remains poor  Discussed with daughters at bedside    Ayaan Mendoza PA-C    [facility]  [phone]

## 2018-09-25 NOTE — DISCHARGE NOTE ADULT - HOSPITAL COURSE
Patient with ovarian ca, ascites tube, venting peg, recent sbo now with airfluid levels and peritonitis with enterocolitis on ct with fecalent drainage from peg and very malodorous fluid from around peg and ascites tube. Presume she has a perforation with ongoing obstruction and antibiotics will have no long term impact on this situation which is not really compatible with life, hospice most appropriate as I don't suspect surgery would be survived in any meaningful way.   Plan: for now will continue zosyn  agree with hospice evaluation Patient with ovarian ca, ascites tube, venting peg, recent sbo now with airfluid levels and peritonitis with enterocolitis on ct with fecalent drainage from peg and very malodorous fluid from around peg and ascites tube. Presume she has a perforation with ongoing obstruction and antibiotics will have no long term impact on this situation which is not really compatible with life, hospice most appropriate as I don't suspect surgery would be survived in any meaningful way.   Plan:hospice care Patient with  metastatic  ovarian ca, ,  be d bound. functional  quadriplegia, /    malnutrition severe, albumin in  2 range   ascites drainage  w/ ?  perforation, seen by surg, no intervention,  with enterocolitis on ct with fecalent drainage from peg and very malodorous fluid from around peg and ascites tube. Presume she has a perforation with ongoing obstruction and antibiotics will have no long term impact on this situation which is not really compatible with life, hospice most appropriate as I don't suspect surgery would be survived in any meaningful way.   Plan :hospice care

## 2018-09-25 NOTE — PROGRESS NOTE ADULT - ASSESSMENT
77F Mandarin speaking   PMH  hep B,  breast CA  , lung CA (s/p lobectomy), ovarian CA .  asthma,     admitted  with fevers.  foul smelling drainage  from abdomen    at [facility] [place]  [place]/p  chemo, and family , was  told,  pt  needs  hospice/ no further intervention    surg eval, no intervention, given poor  prognosis/ on  zosyn, with unclear benefit,  agree with ID  has venting  peg and tankoff catheter  ct chest, no pe     pt is  dnr /  seen by  palliative care/ on pleasure feeds at  daughters request  on dialaudid,   for pain  ct  abdomen, noted  venting peg with stool/ malodorous fluid/ very poor prognosis, ideal  candidate for  hospice     < from: CT Abdomen and Pelvis w/ IV Cont (09.22.18 @ 14:59) >  IMPRESSION:     Fluid and air including air-fluid level is seen throughout the peritoneal   cavity with thickened peritoneal enhancement consistent with significant   peritonitis.   Resultant edematous thickening of the small bowel and transverse colon   within the peritoneal cavity consistent with secondary enteritis/colitis.  Focal intraperitoneal air is noted adjacent to the mid to distal stomach   of uncertain significance as air is seen throughout the intraperitoneal   cavity. If a stomach defect is a clinical concern, recommend further   evaluation.   Cannot entirely exclude a small filling defect within a branch right   lower lobe pulmonary artery on the first image of this study. Recommend   CT examination of the CT angiography of the chest further evaluation.  Recommend lower extremity venous Doppler examination to evaluate the   veins of the left lower extremity.  Findings were discussed with [place] Mireles by Dr. Ruiz on 9/22/2018 at   6:38 PM with readback.    < end of copied text >

## 2018-09-25 NOTE — DISCHARGE NOTE ADULT - CARE PLAN
Principal Discharge DX:	Bandemia  Goal:	improve quality of life  Assessment and plan of treatment:	Hospice care.  Secondary Diagnosis:	Malignant ascites  Assessment and plan of treatment:	hospice care  Secondary Diagnosis:	Malignant neoplasm of both ovaries  Assessment and plan of treatment:	Hospice care

## 2018-09-25 NOTE — PROGRESS NOTE ADULT - PROBLEM SELECTOR PLAN 3
Continue current management . Found patient alert , verbal without complaints of pain.  States pain scale is about a 4  Low tolerance for drip, received five doses IV  Dilaudid in 24 hours

## 2018-09-25 NOTE — PROGRESS NOTE ADULT - SUBJECTIVE AND OBJECTIVE BOX
Patient is a 78y old  Female who presented with a chief complaint of abdominal discomfort (25 Sep 2018 10:27)      INTERVAL HPI/OVERNIGHT EVENTS:  no new events  continues to have generalized abdominal pain and continues drainage from G tube and around tube site  no fever  family is agreeable to hospice if no further treatment planned - awaiting to hear back from primary [place] [facility] if any possible intervention offered/available given clinical condition    MEDICATIONS  (STANDING):  heparin  Injectable 5000 Unit(s) SubCutaneous every 12 hours  pantoprazole   Suspension 40 milliGRAM(s) Oral daily  piperacillin/tazobactam IVPB. 3.375 Gram(s) IV Intermittent every 8 hours    MEDICATIONS  (PRN):  HYDROmorphone  Injectable 0.5 milliGRAM(s) IV Push every 3 hours PRN dyspnea  HYDROmorphone  Injectable 0.5 milliGRAM(s) IV Push every 3 hours PRN mild mod severe pain  LORazepam   Injectable 0.5 milliGRAM(s) IV Push every 6 hours PRN agitation/anxiety      Allergies  Allergy Status Unknown  No Known Allergies      Review of Systems:  General:  +fever +fatigue +generalized weakness  CV:  No pain, palpitations, hypo/hypertension  Resp:  No dyspnea, cough, tachypnea, wheezing  GI:  see HPI  :  No pain, bleeding, incontinence, nocturia  Muscle:  No pain, weakness  Neuro:  No weakness, tingling, memory problems  Psych:  No fatigue, insomnia, mood problems, depression  Endocrine:  No polyuria, polydypsia, cold/heat intolerance  Heme:  No petechiae, ecchymosis, easy bruisability, see HPI  Skin:  No rash, tattoos, scars, edema    Vital Signs Last 24 Hrs  T(C): 36.3 (25 Sep 2018 07:49), Max: 36.7 (24 Sep 2018 16:53)  T(F): 97.4 (25 Sep 2018 07:49), Max: 98 (24 Sep 2018 16:53)  HR: 99 (25 Sep 2018 07:49) (93 - 101)  BP: 120/72 (25 Sep 2018 07:49) (120/72 - 146/77)  BP(mean): --  RR: 18 (25 Sep 2018 07:49) (18 - 18)  SpO2: 91% (25 Sep 2018 07:49) (91% - 93%)    PHYSICAL EXAM:  Constitutional: frail appearing  female, non-toxic appearing.  daughters at bedside  Neck: No LAD, supple. no JVD  Respiratory: decreased SB at bases  Cardiovascular: S1 and S2, RRR  Gastrointestinal: softly distended +generalized tenderness to palpation +G tube to drainage - purulent and gastric/enteric contents  +catheter LUQ  Extremities: No peripheral edema, neg clubing, cyanosis  Vascular: 2+ peripheral pulses  Neurological: no focal asymmetry  Skin: No rashes    LABS:                        8.5    13.69 )-----------( 380      ( 24 Sep 2018 09:21 )             27.4     09-24    141  |  105  |  10  ----------------------------<  83  3.3<L>   |  25  |  0.43<L>    Ca    7.7<L>      24 Sep 2018 07:04          RADIOLOGY & ADDITIONAL TESTS:

## 2018-09-25 NOTE — DISCHARGE NOTE ADULT - MEDICATION SUMMARY - MEDICATIONS TO TAKE
I will START or STAY ON the medications listed below when I get home from the hospital:    HYDROmorphone  -- 0.5 milligram(s) intravenous every 3 hours, As Needed sob  -- Indication: For ovarian ca with mets    HYDROmorphone  -- 0.5 milligram(s) intravenous every 3 hours, As Needed for pain  -- Indication: For ovarian ca with mets    LORazepam  -- 0.5 milligram(s) intravenous every 6 hours, As Needed anxiety  -- Indication: For Anxiety    Protonix IV 40 mg intravenous injection  -- 40 milligram(s) intravenous once a day  -- Indication: For gerd

## 2018-09-25 NOTE — PROGRESS NOTE ADULT - PROBLEM SELECTOR PLAN 4
Patient will be transferred to [facility] for inpatient Hospice care.  Full comfort approach.  Family would like to complete antibiotic regimen,   they would also like to continue gently iv hydration.  Signing off, reconsult for any acute issues.

## 2018-09-25 NOTE — PROGRESS NOTE ADULT - SUBJECTIVE AND OBJECTIVE BOX
SUBJECTIVE AND OBJECTIVE:  INTERVAL HPI/OVERNIGHT EVENTS:  more comfortable, awake alert, mandarin speaking    DNR on chart: Yes  Yes    Allergies    Allergy Status Unknown  No Known Allergies    Intolerances    MEDICATIONS  (STANDING):  heparin  Injectable 5000 Unit(s) SubCutaneous every 12 hours  pantoprazole   Suspension 40 milliGRAM(s) Oral daily  piperacillin/tazobactam IVPB. 3.375 Gram(s) IV Intermittent every 8 hours    MEDICATIONS  (PRN):  HYDROmorphone  Injectable 0.5 milliGRAM(s) IV Push every 3 hours PRN dyspnea  HYDROmorphone  Injectable 0.5 milliGRAM(s) IV Push every 3 hours PRN mild mod severe pain  LORazepam   Injectable 0.5 milliGRAM(s) IV Push every 6 hours PRN agitation/anxiety      ITEMS UNCHECKED ARE NOT PRESENT    PRESENT SYMPTOMS: [ ]Unable to obtain due to poor mentation   Source if other than patient:  [ ]Family   [ ]Team     Pain (Impact on QOL):    Location: abdomen  Minimal acceptable level (0-10 scale):            Aggravating factors: movement   Quality: sharp  Radiation: back  Severity (0-10 scale):   today 4  Timing:    Dyspnea:                           [ ]Mild [ ]Moderate [ ]Severe  Anxiety:                             [ ]Mild [ ]Moderate [ ]Severe  Fatigue:                             [ ]Mild [ ]Moderate [ ]Severe  Nausea:                             [ ]Mild [ ]Moderate [ ]Severe  Loss of appetite:              [ ]Mild [ ]Moderate [x ]Severe  Constipation:                    [ ]Mild [ ]Moderate [ ]Severe    PAIN AD Score:	  http://geriatrictoolkit.[email or web address]/[email or web address].pdf (Ctrl + left click to view)    Other Symptoms:  [x ]All other review of systems negative     Karnofsky Performance Score/Palliative Performance Status Version 2: 30       %    http://palliative.info/resource_material/[email or web address].pdf  PHYSICAL EXAM:  Vital Signs Last 24 Hrs  T(C): 36.3 (25 Sep 2018 07:49), Max: 36.7 (24 Sep 2018 16:53)  T(F): 97.4 (25 Sep 2018 07:49), Max: 98 (24 Sep 2018 16:53)  HR: 99 (25 Sep 2018 07:49) (93 - 101)  BP: 120/72 (25 Sep 2018 07:49) (120/72 - 146/77)  BP(mean): --  RR: 18 (25 Sep 2018 07:49) (18 - 18)  SpO2: 91% (25 Sep 2018 07:49) (91% - 93%) I&O's Summary    24 Sep 2018 07:01  -  25 Sep 2018 07:00  --------------------------------------------------------  IN: 100 mL / OUT: 975 mL / NET: -875 mL     GENERAL:  x ]Alert  [ x]Oriented x   [ ]Lethargic  [x ]Cachexia  [ ]Unarousable  [ x]Verbal  [ ]Non-Verbal    Behavioral:   [ ] Anxiety  [ ] Delirium [ ] Agitation [ ] Other    HEENT:  [ ]Normal   [x ]Dry mouth   [ ]ET Tube/Trach  [ ]Oral lesions    PULMONARY:   [ ]Clear [ ]Tachypnea  [ ]Audible excessive secretions   [ ]Rhonchi        [ ]Right [ ]Left [ ]Bilateral  [ ]Crackles        [ ]Right [ ]Left [ ]Bilateral  [ ]Wheezing     [ ]Right [ ]Left [ ]Bilateral    CARDIOVASCULAR:    [ x]Regular [ ]Irregular [ x]Tachy  [ ]Bull [ ]Murmur [ ]Other    GASTROINTESTINAL:  [ ]Soft  [x ]Distended   [ ]+BS  [ ]Non tender [x ]Tender  [x ]  VENTI PEG [ ]OGT/ NGT   Last BM:    GENITOURINARY:  [ ]Normal [X ] Incontinent   [ ]Oliguria/Anuria   [ ]Das    MUSCULOSKELETAL:   [ ]Normal   [ X]Weakness  [ ]Bed/Wheelchair bound [ ]Edema    NEUROLOGIC:   [ ]No focal deficits  [ ] Cognitive impairment  [ ] Dysphagia [ ]Dysarthria [ ] Paresis [ ]Other     SKIN:   [X ]Normal   [ ]Pressure ulcer(s)  [ ]Rash    CRITICAL CARE:  [ ] Shock Present  [ ]Septic [ ]Cardiogenic [ ]Neurologic [ ]Hypovolemic  [ ]  Vasopressors [ ]  Inotropes   [ ] Respiratory failure present  [ ] Acute  [ ] Chronic [ ] Hypoxic  [ ] Hypercarbic [ ] Other  [ ] Other organ failure     LABS:                        8.5    13.69 )-----------( 380      ( 24 Sep 2018 09:21 )             27.4   09-24    141  |  105  |  10  ----------------------------<  83  3.3<L>   |  25  |  0.43<L>    Ca    7.7<L>      24 Sep 2018 07:04          RADIOLOGY & ADDITIONAL STUDIES:    Protein Calorie Malnutrition Present: [X ] yes [ ] no  [ ] PPSV2 < or = 30%  [ ] significant weight loss [ X] poor nutritional intake [ X] anasarca [ ] catabolic state Albumin, Serum: 2.3 g/dL (09-22-18 @ 12:26)  Artificial Nutrition [ ]     REFERRALS:   [ ]Chaplaincy  [ X] Hospice  [ ]Child Life  [ ]Social Work  [ ]Case management [ ]Holistic Therapy   Goals of Care Document: Goals of Care Conversation - Personal Advance Directive [CHANDLER Harris] (09-24-18 @ 17:55)

## 2018-09-27 LAB
CULTURE RESULTS: SIGNIFICANT CHANGE UP
CULTURE RESULTS: SIGNIFICANT CHANGE UP
SPECIMEN SOURCE: SIGNIFICANT CHANGE UP
SPECIMEN SOURCE: SIGNIFICANT CHANGE UP

## 2020-12-28 NOTE — DISCHARGE NOTE ADULT - VISION (WITH CORRECTIVE LENSES IF THE PATIENT USUALLY WEARS THEM):
OB Followup;    39w0d    Patient Active Problem List    Diagnosis Date Noted   • Encounter for supervision of normal first pregnancy in second trimester 2020       Vitals:    20 1058   BP: 107/68   Weight: 61.9 kg (136 lb 8 oz)       Patient presents for followup of OB care. Currently doing well . Good fetal movement no leakage of fluid no contractions or vaginal bleeding        Size equals dates, normal fetal heart rate  Cervix fingertip/90% effaced/soft/anterior/cephalic presentation        Labor precautions given  Increase oral hydration  Discussed proper weight gain during pregnancy  Continue prenatal vitamins  Signs and symptoms of labor/ labor discussed   Discussed our group's policy on prenatal checkups and delivery    Discussed Covid precautions  Discussed Covid procedures on labor and delivery  Discussed Covid vaccination recommendations from CDC/ACOG    Duction of labor schedule 2020  
Pt is here for OB follow-up  No VB, UC's or LOF.  Good phone #:[phone]  Pharmacy verified.  Pt states no other complaints for today.  Pt states would like to have a cervical check today.  Patient is scheduled for GEL on 12/31/20 at 9pm and IOL on 1/1/21 at 9am, info given and explained.  
Partially impaired: cannot see medication labels or newsprint, but can see obstacles in path, and the surrounding layout; can count fingers at arm's length

## 2021-01-01 NOTE — ED PROVIDER NOTE - CPE EDP EYES NORM
SUBJECTIVE:     Lauren Bañuelos is a 6 month old female, here for a routine health maintenance visit.    Patient was roomed by: Yara Worley CMA    Well Child    Social History  Forms to complete? No  Child lives with::  Mother, father and brother  Who takes care of your child?:  Home with family member, father and mother  Languages spoken in the home:  OTHER*  Recent family changes/ special stressors?:  None noted    Safety / Health Risk  Is your child around anyone who smokes?  No    TB Exposure:     No TB exposure    Car seat < 6 years old, in  back seat, rear-facing, 5-point restraint? Yes    Home Safety Survey:      Stairs Gated?:  NO     Wood stove / Fireplace screened?  Not applicable     Poisons / cleaning supplies out of reach?:  Yes     Swimming pool?:  No     Firearms in the home?: No      Hearing / Vision  Hearing or vision concerns?  No concerns, hearing and vision subjectively normal    Daily Activities    Water source:  City water  Nutrition:  Breastmilk, pumped breastmilk by bottle and pureed foods  Breastfeeding concerns?  None, breastfeeding going well; no concerns  Vitamins & Supplements:  Yes      Vitamin type: D only    Elimination       Urinary frequency:1-3 times per 24 hours     Stool frequency: 1-3 times per 24 hours     Stool consistency: soft     Elimination problems:  None    Sleep      Sleep arrangement:crib and co-sleeping with parent    Sleep position:  On back, on side and on stomach    Sleep pattern: wakes at night for feedings and sleeps through the night      [place]  Depression Scale (EPDS) Risk Assessment: Completed [place]    Dental visit recommended: No  Dental varnish not indicated, no teeth    DEVELOPMENT  Screening tool used, reviewed with parent/guardian:   ASQ 6 M Communication Gross Motor Fine Motor Problem Solving Personal-social   Score 40 50 40 45 55   Cutoff 29.65 22.25 25.14 27.72 25.34   Result Passed Passed Passed Passed Passed     Milestones  "(by observation/ exam/ report) 75-90% ile  PERSONAL/ SOCIAL/COGNITIVE:    Turns from strangers    Reaches for familiar people    Looks for objects when out of sight  LANGUAGE:    Laughs/ Squeals    Turns to voice/ name    Babbles  GROSS MOTOR:    Rolling    Pull to sit-no head lag    Sit with support  FINE MOTOR/ ADAPTIVE:    Puts objects in mouth    Raking grasp    Transfers hand to hand    PROBLEM LIST  Patient Active Problem List   Diagnosis     Normal  (single liveborn)     MEDICATIONS  No current outpatient medications on file.      ALLERGY  No Known Allergies    IMMUNIZATIONS  Immunization History   Administered Date(s) Administered     DTAP-IPV/HIB (PENTACEL) 2021, 2021     Hep B, Peds or Adolescent 2021, 2021, 2021     Pneumo Conj 13-V (2010&after) 2021, 2021     Rotavirus, pentavalent 2021       HEALTH HISTORY SINCE LAST VISIT  No surgery, major illness or injury since last physical exam    ROS  GENERAL:  NEGATIVE for fever, poor appetite, and sleep disruption.  SKIN:  NEGATIVE for rash, hives, and eczema.  EYE:  NEGATIVE for pain, discharge, redness, itching.  ENT:  NEGATIVE for runny nose, congestion.  RESP:  NEGATIVE for cough, wheezing, and difficulty breathing.  CARDIAC:  NEGATIVE for cyanosis.   GI:  NEGATIVE for vomiting, diarrhea,  constipation.  :  NEGATIVE for urinary problems.  NEURO:  NEGATIVE for weakness.  ALLERGY:  As in Allergy History  MSK:  NEGATIVE for muscle problems and joint problems.    OBJECTIVE:   EXAM  Pulse 102   Temp 98.5  F (36.9  C) (Axillary)   Ht 0.711 m (2' 4\")   Wt 7.598 kg (16 lb 12 oz)   SpO2 99%   BMI 15.02 kg/m    No head circumference on file for this encounter.  49 %ile (Z= -0.02) based on WHO (Girls, 0-2 years) weight-for-age data using vitals from 2021.  96 %ile (Z= 1.70) based on WHO (Girls, 0-2 years) Length-for-age data based on Length recorded on 2021.  13 %ile (Z= -1.12) based on WHO " (Girls, 0-2 years) weight-for-recumbent length data based on body measurements available as of 2021.  GENERAL: Active, alert,  no  distress.  SKIN: Clear. No significant rash, abnormal pigmentation or lesions.  HEAD: Normocephalic. Normal fontanels and sutures.  EYES: Conjunctivae and cornea normal. Red reflexes present bilaterally.  EARS: normal: no effusions, no erythema, normal landmarks  NOSE: Normal without discharge.  MOUTH/THROAT: Clear. No oral lesions.  NECK: Supple, no masses.  LYMPH NODES: No adenopathy  LUNGS: Clear. No rales, rhonchi, wheezing or retractions  HEART: Regular rate and rhythm. Normal S1/S2. No murmurs. Normal femoral pulses.  ABDOMEN: Soft, non-tender, not distended, no masses or hepatosplenomegaly. Normal umbilicus and bowel sounds.   GENITALIA: Normal female external genitalia. Reid stage I,  No inguinal herniae are present.  EXTREMITIES: Hips normal with negative Ortolani and Lizarraga. Symmetric creases and  no deformities  NEUROLOGIC: Normal tone throughout. Normal reflexes for age    ASSESSMENT/PLAN:   Lauren was seen today for well child.    Diagnoses and all orders for this visit:    Encounter for routine child health examination w/o abnormal findings  -     MATERNAL HEALTH RISK ASSESSMENT (48966)- EPDS  -     Screening Questionnaire for Immunizations  -     DTAP - HIB - IPV VACCINE, IM USE (Pentacel) [[id number]]  -     HEPATITIS B VACCINE,PED/ADOL,IM [[id number]]  -     PNEUMOCOCCAL CONJ VACCINE 13 VALENT IM [[id number]]  -     ROTAVIRUS, 3 DOSE, PO (6WKS - 8 MO AND 0 DAYS) - RotaTeq ([id number])        Anticipatory Guidance  The following topics were discussed:  SOCIAL/ FAMILY:    reading to child  NUTRITION:    advancement of solid foods    breastfeeding or formula for 1 year  HEALTH/ SAFETY:    sleep patterns    teething/ dental care    car seat    no walkers    Preventive Care Plan   Immunizations     See orders in [facility].  I reviewed the signs and symptoms of adverse effects and  when to seek medical care if they should arise.    Reviewed, behind on immunizations, completing series  Referrals/Ongoing Specialty care: No   See other orders in [facility]    Resources:  [place] Child and Teen Checkups (C&TC) Schedule of Age-Related Screening Standards    FOLLOW-UP:    9 month Preventive Care visit    DO MARC[facility]   normal...

## 2021-11-18 NOTE — ED ADULT TRIAGE NOTE - MODE OF ARRIVAL
[address] [facility] [address] [place] [address]  Phone: [phone]  Fax: [phone]    Elisa Mistry MD        November 18, 2021     Patient: Luz Maria Alcantara   YOB: 1989   Date of Visit: 11/18/2021       To Whom It May Concern: It is my medical opinion that Luz Maria Alcantara may return to work on next scheduled shift with the following restrictions: not to operate machinery . If you have any questions or concerns, please don't hesitate to call.     Sincerely,        Elisa Mistry MD
daughter/Other

## 2022-09-26 NOTE — PATIENT PROFILE ADULT. - PRO INTERPRETER NEED 2
BEHAVIORAL HEALTH FOLLOW-UP NOTE     9/26/2022     Patient was seen and examined in person, Chart reviewed   Patient's case discussed with staff/team    Chief Complaint: \" I'm not thinking about hurting myself\"     Interim History: I saw patient in his room, he tells me he left too early last time. He is very linear and goal directed, highly suspicious of secondary gain. He is asking about housing and resources, he is asking about employment, he then goes on to tell me that he was mad at the nurse for waking him up while he was sleeping. He denies SI/HI intent or plan denies auditory visual hallucinations or other perceptual disturbances. He makes good eye contact remains linear, organized and goal directed. Appetite:   [x] Normal/Unchanged  [] Increased  [] Decreased      Sleep:       [x] Normal/Unchanged  [] Fair       [] Poor              Energy:    [x] Normal/Unchanged  [] Increased  [] Decreased        SI [] Present  [x] Absent    HI  []Present  [x] Absent     Aggression:  [] yes  [x] no    Patient is [x] able  [] unable to CONTRACT FOR SAFETY     PAST MEDICAL/PSYCHIATRIC HISTORY:   Past Medical History:   Diagnosis Date    Major depressive disorder        FAMILY/SOCIAL HISTORY:  History reviewed. No pertinent family history. Social History     Socioeconomic History    Marital status: Single     Spouse name: Not on file    Number of children: Not on file    Years of education: Not on file    Highest education level: Not on file   Occupational History    Not on file   Tobacco Use    Smoking status: Every Day     Packs/day: 1.00     Types: Cigarettes    Smokeless tobacco: Never   Vaping Use    Vaping Use: Never used   Substance and Sexual Activity    Alcohol use: Yes     Alcohol/week: 36.0 standard drinks     Types: 36 Cans of beer per week     Comment: Patient reports \"I only drink when I do cocaine. \"    Drug use: Yes     Frequency: 4.0 times per week     Types: Cocaine, Marijuana Cleve Castro     Comment: Patient reports \"4 times a week\"    Sexual activity: Not Currently   Other Topics Concern    Not on file   Social History Narrative    Not on file     Social Determinants of Health     Financial Resource Strain: Not on file   Food Insecurity: Not on file   Transportation Needs: Not on file   Physical Activity: Not on file   Stress: Not on file   Social Connections: Not on file   Intimate Partner Violence: Not on file   Housing Stability: Not on file           ROS:  [x] All negative/unchanged except if checked.  Explain positive(checked items) below:  [] Constitutional  [] Eyes  [] Ear/Nose/Mouth/Throat  [] Respiratory  [] CV  [] GI  []   [] Musculoskeletal  [] Skin/Breast  [] Neurological  [] Endocrine  [] Heme/Lymph  [] Allergic/Immunologic    Explanation:     MEDICATIONS:    Current Facility-Administered Medications:     divalproex (DEPAKOTE) DR tablet 250 mg, 250 mg, Oral, BID, William Zhou MD, 250 mg at 09/26/22 0935    OLANZapine (ZYPREXA) tablet 10 mg, 10 mg, Oral, Nightly, William Zhou MD, 10 mg at 09/25/22 2040    acetaminophen (TYLENOL) tablet 650 mg, 650 mg, Oral, Q6H PRN, William Zhou MD, 650 mg at 09/25/22 0908    magnesium hydroxide (MILK OF MAGNESIA) 400 MG/5ML suspension 30 mL, 30 mL, Oral, Daily PRN, William Zhou MD    nicotine (NICODERM CQ) 21 MG/24HR 1 patch, 1 patch, TransDERmal, Daily, William Zhou MD    aluminum & magnesium hydroxide-simethicone (MAALOX) 200-200-20 MG/5ML suspension 30 mL, 30 mL, Oral, PRN, William Zhou MD    hydrOXYzine pamoate (VISTARIL) capsule 50 mg, 50 mg, Oral, TID PRN, William Zhou MD    haloperidol (HALDOL) tablet 5 mg, 5 mg, Oral, Q6H PRN **OR** haloperidol lactate (HALDOL) injection 5 mg, 5 mg, IntraMUSCular, Q6H PRN, William Zhou MD    melatonin tablet 3 mg, 3 mg, Oral, Nightly, William Zhou MD, 3 mg at 09/25/22 2040    neomycin-bacitracin-polymyxin (NEOSPORIN) ointment, , Topical, BID, Chirag Brunner MD, 1 g at 09/26/22 4864      Examination:  /82   Pulse 60   Temp 97.7 °F (36.5 °C)   Resp 15   Ht 5' 7.5\" (1.715 m)   Wt 176 lb 14.4 oz (80.2 kg)   SpO2 94%   BMI 27.30 kg/m²   Gait - steady  Medication side effects(SE): Denies    Mental Status Examination:    Level of consciousness:  within normal limits   Appearance:  fair grooming and fair hygiene  Behavior/Motor:  no abnormalities noted  Attitude toward examiner:  cooperative  Speech:  spontaneous, normal rate and normal volume   Mood: \" \"I am all right. \"  Affect: Irritable   thought processes: Linear thought flight of ideas loose associations  Thought content: Devoid of any auditory visual hallucinations delusions or other perceptual normalities. Denies SI/HI intent or plan  Cognition:  oriented to person, place, and time   Concentration intact  Insight poor   Judgement poor     ASSESSMENT:   Patient symptoms are:  [] Well controlled  [] Improving  [] Worsening  [x] No change      Diagnosis:   Principal Problem:    Bipolar disorder, curr episode mixed, severe, w/o psychotic features ([facility]  Active Problems:    Cocaine abuse ([facility]  Resolved Problems:    * No resolved hospital problems.  *      LABS:    Recent Labs     09/24/22  1016   WBC 9.1   HGB 14.8          Recent Labs     09/24/22  1016      K 4.1   CL 98   CO2 25   BUN 14   CREATININE 1.2   GLUCOSE 90     Recent Labs     09/24/22  1016   BILITOT 0.8   ALKPHOS 88   AST 27   ALT 26       Lab Results   Component Value Date/Time    LABAMPH NOT DETECTED 09/24/2022 10:16 AM    BARBSCNU NOT DETECTED 09/24/2022 10:16 AM    LABBENZ POSITIVE 09/24/2022 10:16 AM    LABMETH NOT DETECTED 09/24/2022 10:16 AM    OPIATESCREENURINE NOT DETECTED 09/24/2022 10:16 AM    PHENCYCLIDINESCREENURINE NOT DETECTED 09/24/2022 10:16 AM    ETOH <10 09/24/2022 10:16 AM     Lab Results   Component Value Date/Time    TSH 0.935 01/06/2017 10:44 PM     No results found for: LITHIUM  Lab Results   Component Value Date    VALPROATE 20 (L) 09/24/2022           Treatment Plan:  Reviewed current Medications with the patient. Risks, benefits, side effects, drug-to-drug interactions and alternatives to treatment were discussed. Collateral information:   CD evaluation  Encourage patient to attend group and other milieu activities.   Discharge planning discussed with the patient and treatment team.    Increase Depakote 250 mg twice daily  Zyprexa 10 mg at bedtime    Consult dental as patient is reporting dental pain  Will start Orajel    PSYCHOTHERAPY/COUNSELING:  [x] Therapeutic interview  [x] Supportive  [] CBT  [] Ongoing  [] Other    [x] Patient continues to need, on a daily basis, active treatment furnished directly by or requiring the supervision of inpatient psychiatric personnel      Anticipated Length of stay: 3 to 7 days based on stability            Electronically signed by EFREN Lopez CNP on 9/26/2022 at 2:22 PM Chinese

## 2022-11-28 NOTE — ED ADULT NURSE NOTE - OBJECTIVE STATEMENT
[address] [facility] [id number]    Wound Management Visit    Date: 2022  Patient: Julio Varghese  MRN: [id number]  : 1947    Reason for Visit:  Julio Varghese is here today for a dressing change and a wound check.  He is accompanied by no one.    Referred by:  Patient is referred by Dr Santos.    HPI:  patient presents with the left LE wrap intact. He said he felt some burning in the back of his leg over the weekend.  The right LE is in a compression stocking. Patient states he is having no trouble donning stocking.     Physical Exam:  Wound  Location: left LE  Size: 1.4cm x 1.2cm x 0.1cm   Exudate: moderate  Wound Edges and Periwound Tissue: irregular and non-erythemic  Edema: controlled by compression  Wound Bed Description: red 100%  Tissue Type: granulation  Odor: none  Circumferences: n/a      Physical Exam    Wound Treatment:  Removed dressing   Cleansed with saline and soap and water   Applied body oil to LLE  Alginate with AG with promogran   Unna Boot Left LE  Compression Bandage short stretch coflex XL LLE  Covered with nylon    Instructions:  Keep wrap dry and intact.     Assessment and Plan:  1. Open wound      Open wound  New open wound today on the Left posterior LE. No s/s of infection today. Patient does have compression stockings. Currently in one with no issues on the right LE.             Return in about 1 week (around 2022).    Estee Rader RN  2022 1:23 PM        
pt to room 19 via wheel chair from home c/.o abd pain weakness  foul drainage from abdomen. pt with h/o lung ca opovarian and colon ca . pt mandarin speakling pt pale skin warm dry lungs cl denies chest painabd with2 tubes and dry sterile dressing covering as a tankoff tube and tube to drainage bag. .pt with r port accessed with no blood return iv placed lac labs obtained and sent. pt med as ordered.

## 2023-02-17 NOTE — CONSULT NOTE ADULT - ASSESSMENT
79 y/o woman with history of hep B, breast cancer (2006), lung cancer in 2016, and ovarian cancer (2017) s/p debulking procedure and chemotherapy (all oncology care at [facility]) who was last admitted at [facility] from 9/4 - 9/19 for a malignant small bowel obstruction. S/p placement of palliative G-tube and tunnelled paracentesis catheter. Presents to the ED for drainage around the g-tube. CT showing air and fluid throughout the peritoneal cavity with focal air adjacent to the distal stomach.    PLAN  Abx per ID  Surg/Onc input noted - no planned intervention  Agree with Palliative approach  Can drain ascites prn (has tunneled catheter placed) to help alleviate some distension/discomfort, but likely would not affect overall prognosis  G tube to drainage  Pain management    Overall prognosis remains poor  Discussed with daughter at bedside  Thank you for the courtesy of this consult.    Ayaan Mendoza PA-C    [facility]  [phone] 77 y/o woman with history of hep B, breast cancer (2006), lung cancer in 2016, and ovarian cancer (2017) s/p debulking procedure and chemotherapy (all oncology care at [facility]) who [place] [facility] for a malignant small bowel obstruction. S/p placement of a tunneled peritoneal catheter for ascites followed by a decompressive palliative G-tube at [facility]. Presents to the ED for drainage around the g-tube. CT showing air and fluid throughout the peritoneal cavity with focal air adjacent to the distal stomach.  Now with peritonitis.  The patient is in a life threatening situation.  Overall guarded progress.  Felt by surgical team not be a surgical candidate.  Peritonitis could be related to indwelling peritoneal catheter or due to leakage into the peritoneal space at the PEG ostomy site.    PLAN  Abx per ID  Surg/Onc input noted - no planned intervention and the external bumper was tightened by surgery.  Palliative approach is appropriate.  Although additional IR drains may be considered for palliation, it is not clear that IR interventions would change the patient's prognosis.   G tube to drainage  Pain management    Overall prognosis remains poor  Discussed with daughter at bedside  Thank you for the courtesy of this consult.    Ayaan Mendoza PA-C    [facility]  [phone] 77 y/o woman with history of hep B, breast cancer (2006), lung cancer in 2016, and ovarian cancer (2017) s/p debulking procedure and chemotherapy (all oncology care at [facility]) who [place] [facility] [place] a malignant small bowel obstruction. S/p placement of a tunneled peritoneal catheter for ascites followed by a decompressive palliative G-tube at [facility]. Presents to the ED for drainage around the g-tube. CT showing air and fluid throughout the peritoneal cavity with focal air adjacent to the distal stomach.  Now with peritonitis.  The patient is in a life threatening situation.  Overall guarded progress.  Felt by surgical team not be a surgical candidate.  Peritonitis could be related to indwelling peritoneal catheter or due to leakage into the peritoneal space at the PEG ostomy site.    PLAN  Abx per ID  Surg/Onc input noted - no planned intervention and the external bumper was tightened by surgery.  Palliative approach is appropriate.  G tube to drainage  Pain management    Overall prognosis remains poor  Discussed with daughter at bedside  Thank you for the courtesy of this consult.    Ayaan Mendoza PA-C    [facility]  [phone] No

## 2023-08-29 NOTE — PATIENT PROFILE ADULT. - NS TRANSFER PATIENT BELONGINGS
Immunization  Immunization performed in LD by Bren Herndon MA. Patient tolerated the procedure well without complications.  08/29/23   Bren Herndon MA     None

## 2024-10-08 NOTE — ED ADULT NURSE NOTE - ATTEMPT TO OOB
reported flash pulm edema at [place] , started on torsemide , here briefly hypotensive s/p Fluids   - hold torsemide , additional diuretics as needed   -f/u echo no

## 2025-05-01 NOTE — PATIENT PROFILE ADULT. - PRO ANTICIPATED DISCH DISP
15-year-old male with history of traumatic brain injury and craniotomy in 2018, previously evaluated and followed by PCP. Documentation confirms clearance by PCP in 2023 following a normal brain MRI with no structural abnormalities and no need for neurology referral at that time. Today, neurologic exam with non-focal findings with mild facial droop on left side and the patient is stable without any concerning symptoms. Given prior clearance and current intact neuro exam, patient is cleared for school and physical activity per prior PCP documentation. ED precautions reviewed and given, including headache, vomiting, weakness, confusion, or any new neurologic symptoms.  
Hua is a 15-year-old male seen today at school for a routine physical examination. He has a history of traumatic brain injury s/p craniotomy in 2018 after a fall, with intermittent left facial droop. Per documentation, he was cleared for full activities by his PCP in 2023 following a normal brain MRI.  The student had no concerns at the time of the visit.  A comprehensive physical exam was performed and found to be within normal limits. Growth and development appear age-appropriate. Immunization status up to date. The student is medically cleared for school attendance and participation in sports or physical activities per PCP documentation in 2023. A copy of the visit will be shared with the family, and they are encouraged to reach out with any follow-up questions or documentation needs.   
unsure